# Patient Record
Sex: FEMALE | Race: OTHER | NOT HISPANIC OR LATINO | Employment: FULL TIME | URBAN - METROPOLITAN AREA
[De-identification: names, ages, dates, MRNs, and addresses within clinical notes are randomized per-mention and may not be internally consistent; named-entity substitution may affect disease eponyms.]

---

## 2017-01-24 ENCOUNTER — OFFICE VISIT (OUTPATIENT)
Dept: MEDICAL GROUP | Facility: CLINIC | Age: 57
End: 2017-01-24
Payer: COMMERCIAL

## 2017-01-24 VITALS
HEIGHT: 64 IN | HEART RATE: 74 BPM | OXYGEN SATURATION: 98 % | RESPIRATION RATE: 16 BRPM | SYSTOLIC BLOOD PRESSURE: 120 MMHG | WEIGHT: 136 LBS | DIASTOLIC BLOOD PRESSURE: 68 MMHG | BODY MASS INDEX: 23.22 KG/M2

## 2017-01-24 DIAGNOSIS — J06.9 VIRAL URI: ICD-10-CM

## 2017-01-24 DIAGNOSIS — J02.9 SORE THROAT: ICD-10-CM

## 2017-01-24 LAB
INT CON NEG: NEGATIVE
INT CON POS: POSITIVE
S PYO AG THROAT QL: NORMAL

## 2017-01-24 PROCEDURE — 87880 STREP A ASSAY W/OPTIC: CPT | Performed by: FAMILY MEDICINE

## 2017-01-24 PROCEDURE — 99213 OFFICE O/P EST LOW 20 MIN: CPT | Performed by: FAMILY MEDICINE

## 2017-01-24 RX ORDER — ERGOCALCIFEROL 1.25 MG/1
CAPSULE ORAL
COMMUNITY
End: 2021-06-15

## 2017-01-24 NOTE — MR AVS SNAPSHOT
"        Fabiana Flores   2017 10:20 AM   Office Visit   MRN: [id number]    Department:  [facility]   Dept Phone:  [phone]    Description:  Female : 1960   Provider:  Linda Heaton M.D.           Reason for Visit     URI headaches; sore throat; sore throat; bilateral ear pain      Allergies as of 2017     Allergen Noted Reactions    Food 10/13/2016       Celiac    Nkda [No Known Drug Allergy] 09/15/2007       Gluten allergy      You were diagnosed with     Viral URI   [496120]       Sore throat   [034461]         Vital Signs     Blood Pressure Pulse Respirations Height Weight Body Mass Index    120/68 mmHg 74 16 1.626 m (5' 4\") 61.689 kg (136 lb) 23.33 kg/m2    Oxygen Saturation Smoking Status                98% Never Smoker           Basic Information     Date Of Birth Sex Race Ethnicity Preferred Language    1960 Female Other Non- English      Your appointments     Mar 10, 2017  3:20 PM   Established Patient with Ray Irizarry D.O.   [address] [id number] [phone]           You will be receiving a confirmation call a few days before your appointment from our automated call confirmation system.              Problem List              ICD-10-CM Priority Class Noted - Resolved    Acquired hypothyroidism E03.9   10/13/2016 - Present    Vaginal atrophy N95.2   10/13/2016 - Present      Health Maintenance        Date Due Completion Dates    IMM DTaP/Tdap/Td Vaccine (1 - Tdap) 1980 ---    MAMMOGRAM 2017, 2015, 2014, 2013, 10/18/2011, 2009, 2009, 2008, 2008, 2007, 2007    PAP SMEAR 10/13/2019 10/13/2016, 10/13/2016 (Done)    Override on 10/13/2016: Done    COLONOSCOPY 2026            Results     POCT Rapid Strep A      Component    Rapid Strep Screen    begative    Internal Control Positive    Positive    Internal Control Negative   " Negative                        Current Immunizations     Influenza Vaccine Quad Inj (Pf) 10/1/2016 11:26 AM      Below and/or attached are the medications your provider expects you to take. Review all of your home medications and newly ordered medications with your provider and/or pharmacist. Follow medication instructions as directed by your provider and/or pharmacist. Please keep your medication list with you and share with your provider. Update the information when medications are discontinued, doses are changed, or new medications (including over-the-counter products) are added; and carry medication information at all times in the event of emergency situations     Allergies:  FOOD - (reactions not documented)     NKDA - (reactions not documented)               Medications  Valid as of: January 24, 2017 - 11:31 AM    Generic Name Brand Name Tablet Size Instructions for use    Acyclovir (Ointment) ZOVIRAX 5 % Apply 1 Application to affected area(s) every 3 hours.        Aspirin (Tablet Delayed Response) ECOTRIN 81 MG Take 81 mg by mouth every day.        Calcium Carb-Cholecalciferol (Tab) Calcium Carb-Cholecalciferol 600-800 MG-UNIT Take 2 tablet by mouth every day.        Ergocalciferol (Cap) DRISDOL 85600 UNITS Take  by mouth every 7 days.        Levothyroxine Sodium (Tab) SYNTHROID 75 MCG Take 1 Tab by mouth Every morning on an empty stomach.        Meloxicam (Tab) MOBIC 15 MG 1 TAB ONCE A DAY ONLY IF NEEDED FOR PAIN AND INFLAMMATION. TAKE WITH FOOD.        Omeprazole (CAPSULE DELAYED RELEASE) PRILOSEC 40 MG Take 1 Cap by mouth 1 time daily as needed.        Silver Sulfadiazine (Cream) SILVADENE 1 % Apply 0.5 g to affected area(s) every day.        .                 Medicines prescribed today were sent to:     [id number] - [place] - [address] [facility]    [address] [facility] [place] [address]    Phone: [phone] Fax: [phone]    Open 24 Hours?: No    [facility] - [address]    [facility] [place] [address]    Phone: [phone] Fax: [phone]    Open 24 Hours?: No      Medication refill instructions:       If your prescription bottle indicates you have medication refills left, it is not necessary to call your provider’s office. Please contact your pharmacy and they will refill your medication.    If your prescription bottle indicates you do not have any refills left, you may request refills at any time through one of the following ways: The online [employer] system (except Urgent Care), by calling your provider’s office, or by asking your pharmacy to contact your provider’s office with a refill request. Medication refills are processed only during regular business hours and may not be available until the next business day. Your provider may request additional information or to have a follow-up visit with you prior to refilling your medication.   *Please Note: Medication refills are assigned a new Rx number when refilled electronically. Your pharmacy may indicate that no refills were authorized even though a new prescription for the same medication is available at the pharmacy. Please request the medicine by name with the pharmacy before contacting your provider for a refill.           [employer] Access Code: Activation code not generated  Current [employer] Status: Active

## 2017-01-24 NOTE — Clinical Note
January 24, 2017       Patient: Fabiana Flores   YOB: 1960   Date of Visit: 1/24/2017         To Whom It May Concern:    It is my medical opinion that Fabiana Flores remain out of work until 1/26/17.    If you have any questions or concerns, please don't hesitate to call [phone]          Sincerely,          Linda Heaton M.D.  Electronically Signed

## 2017-01-24 NOTE — PROGRESS NOTES
"CC: Sore throat, ear pain    HPI:   Fabiana is a 56-year-old female who presents today with the following.    Sore throat, intermittent ear pain, intermittent dry cough for the past 2 days. Slight head congestion. Denies chest congestion or shortness of breath. Denies fever. 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 Feels fatigued. Taking Tylenol for the sore throat which helps.      Patient Active Problem List    Diagnosis Date Noted   • Acquired hypothyroidism 10/13/2016   • Vaginal atrophy 10/13/2016       Current Outpatient Prescriptions   Medication Sig Dispense Refill   • vitamin D, Ergocalciferol, (DRISDOL) 83928 UNITS Cap capsule Take  by mouth every 7 days.     • acyclovir (ZOVIRAX) 5 % Ointment Apply 1 Application to affected area(s) every 3 hours. 1 Tube 3   • levothyroxine (SYNTHROID) 75 MCG Tab Take 1 Tab by mouth Every morning on an empty stomach. 90 Tab 3   • omeprazole (PRILOSEC) 40 MG delayed-release capsule Take 1 Cap by mouth 1 time daily as needed. 30 Cap 6   • Calcium Carb-Cholecalciferol 600-800 MG-UNIT Tab Take 2 tablet by mouth every day. 60 Tab 11   • silver sulfADIAZINE (SILVADENE) 1 % Cream Apply 0.5 g to affected area(s) every day. 30 g 3   • meloxicam (MOBIC) 15 MG tablet 1 TAB ONCE A DAY ONLY IF NEEDED FOR PAIN AND INFLAMMATION. TAKE WITH FOOD. 30 Tab 0   • aspirin EC (ECOTRIN) 81 MG TBEC Take 81 mg by mouth every day.       No current facility-administered medications for this visit.         Allergies as of 01/24/2017 - Rocco as Reviewed 01/24/2017   Allergen Reaction Noted   • Food  10/13/2016   • Nkda [no known drug allergy]  09/15/2007        ROS: As per HPI.    /68 mmHg  Pulse 74  Resp 16  Ht 1.626 m (5' 4\")  Wt 61.689 kg (136 lb)  BMI 23.33 kg/m2  SpO2 98%    Physical Exam:  Gen:         Alert and oriented, No apparent distress.  HEENT:    PERRLA, EOMI, oropharynx mildly erythematous without exudates, TMs clear bilaterally.  Neck:        No Lymphadenopathy   Lungs:     " Clear to auscultation bilaterally  CV:          Regular rate and rhythm. No murmurs, rubs or gallops.                   Ext:          No clubbing, cyanosis, edema.    Office Visit on 01/24/2017   Component Date Value Ref Range Status   • Rapid Strep Screen 01/24/2017 begative   Final   • Internal Control Positive 01/24/2017 Positive   Final   • Internal Control Negative 01/24/2017 Negative   Final         Assessment and Plan.   56 y.o. female with the following issues.    1. Viral URI with sore throat  -Supportive measures. She is given a note to stay off work for at least a couple of days.

## 2017-01-26 ENCOUNTER — TELEPHONE (OUTPATIENT)
Dept: MEDICAL GROUP | Facility: CLINIC | Age: 57
End: 2017-01-26

## 2017-01-26 DIAGNOSIS — J02.9 SORE THROAT: ICD-10-CM

## 2017-01-26 NOTE — TELEPHONE ENCOUNTER
I have ordered a throat culture since her sore throat has worsened. We want to make sure that her rapid strep test was not a false negative. She can go to the lab ASAP so they can obtain swab. Results will take 2-3 days. For pain I recommend ibuprofen 600 mg every 6 hours as needed. Take with food. Also hot liquids such as tea with honey and chicken noodle soup might be soothing. If her symptoms continue to worsen, she should be seen again.

## 2017-01-26 NOTE — TELEPHONE ENCOUNTER
VOICEMAIL (on ext [phone]) Left on 1/24/17 @ 12:49pm  1. Caller Name: Fabiana                      Call Back Number: [phone]    2. Message: Pt stated her throat was still very sore and ears were hurting. Would like to know if there was something she could take OTC or otherwise for pain.     3. Patient approves office to leave a detailed voicemail/MyChart message: yes    Called pt for follow up, see below.    1. Caller Name: Fabiana                                         Call Back Number: [phone]      Patient approves a detailed voicemail message: yes    2. What are the patient's symptoms (location & severity)? Throat and ear pain increased since 1/24/17 visit and would still like to know if Dr. Heaton can recommend OTC medication or otherwise for pain.    3. Is this a new symptom No    4. When did it start? 1/22/17    5. Action taken per Active Symptom Guide: Pt was already seen and only wanted recommendation of medications to help with pain.

## 2017-01-27 ENCOUNTER — HOSPITAL ENCOUNTER (OUTPATIENT)
Dept: LAB | Facility: MEDICAL CENTER | Age: 57
End: 2017-01-27
Attending: INTERNAL MEDICINE
Payer: COMMERCIAL

## 2017-01-27 DIAGNOSIS — E03.8 OTHER SPECIFIED HYPOTHYROIDISM: ICD-10-CM

## 2017-01-27 LAB
ALBUMIN SERPL BCP-MCNC: 4.2 G/DL (ref 3.2–4.9)
ALP SERPL-CCNC: 106 U/L (ref 30–99)
ALT SERPL-CCNC: 38 U/L (ref 2–50)
AST SERPL-CCNC: 36 U/L (ref 12–45)
BILIRUB CONJ SERPL-MCNC: 0.1 MG/DL (ref 0.1–0.5)
BILIRUB INDIRECT SERPL-MCNC: 0.3 MG/DL (ref 0–1)
BILIRUB SERPL-MCNC: 0.4 MG/DL (ref 0.1–1.5)
PROT SERPL-MCNC: 7.8 G/DL (ref 6–8.2)
T4 FREE SERPL-MCNC: 1.1 NG/DL (ref 0.53–1.43)
TSH SERPL DL<=0.005 MIU/L-ACNC: 1.28 UIU/ML (ref 0.3–3.7)

## 2017-01-27 PROCEDURE — 84443 ASSAY THYROID STIM HORMONE: CPT

## 2017-01-27 PROCEDURE — 84439 ASSAY OF FREE THYROXINE: CPT

## 2017-01-27 PROCEDURE — 36415 COLL VENOUS BLD VENIPUNCTURE: CPT

## 2017-01-27 PROCEDURE — 80076 HEPATIC FUNCTION PANEL: CPT

## 2017-03-10 ENCOUNTER — OFFICE VISIT (OUTPATIENT)
Dept: MEDICAL GROUP | Facility: CLINIC | Age: 57
End: 2017-03-10
Payer: COMMERCIAL

## 2017-03-10 VITALS
DIASTOLIC BLOOD PRESSURE: 82 MMHG | TEMPERATURE: 98.2 F | HEIGHT: 64 IN | OXYGEN SATURATION: 98 % | SYSTOLIC BLOOD PRESSURE: 128 MMHG | HEART RATE: 76 BPM | WEIGHT: 137 LBS | BODY MASS INDEX: 23.39 KG/M2

## 2017-03-10 DIAGNOSIS — G43.109 MIGRAINE WITH AURA AND WITHOUT STATUS MIGRAINOSUS, NOT INTRACTABLE: ICD-10-CM

## 2017-03-10 DIAGNOSIS — R05.9 COUGH: ICD-10-CM

## 2017-03-10 DIAGNOSIS — Z87.19 H/O GASTROESOPHAGEAL REFLUX (GERD): ICD-10-CM

## 2017-03-10 PROCEDURE — 99214 OFFICE O/P EST MOD 30 MIN: CPT | Performed by: INTERNAL MEDICINE

## 2017-03-10 RX ORDER — SUMATRIPTAN 25 MG/1
25-100 TABLET, FILM COATED ORAL
Qty: 10 TAB | Refills: 3 | Status: SHIPPED | OUTPATIENT
Start: 2017-03-10 | End: 2021-06-15

## 2017-03-10 RX ORDER — FLUTICASONE PROPIONATE 50 MCG
2 SPRAY, SUSPENSION (ML) NASAL DAILY
Qty: 16 G | Refills: 11 | Status: SHIPPED | OUTPATIENT
Start: 2017-03-10 | End: 2021-06-15

## 2017-03-10 RX ORDER — OMEPRAZOLE 40 MG/1
40 CAPSULE, DELAYED RELEASE ORAL
Qty: 30 CAP | Refills: 6 | Status: SHIPPED | OUTPATIENT
Start: 2017-03-10 | End: 2021-06-15

## 2017-03-10 NOTE — MR AVS SNAPSHOT
"        Fabiana Flores   3/10/2017 3:20 PM   Office Visit   MRN: [id number]    Department:  [facility]   Dept Phone:  [phone]    Description:  Female : 1960   Provider:  Ray Irizarry D.O.           Reason for Visit     Follow-Up           Allergies as of 3/10/2017     Allergen Noted Reactions    Food 10/13/2016       Celiac    Nkda [No Known Drug Allergy] 09/15/2007       Gluten allergy      You were diagnosed with     Cough   [786.2.ICD-9-CM]       Migraine with aura and without status migrainosus, not intractable   [715915]       H/O gastroesophageal reflux (GERD)   [429487]         Vital Signs     Blood Pressure Pulse Temperature Height Weight Body Mass Index    128/82 mmHg 76 36.8 °C (98.2 °F) 1.626 m (5' 4\") 62.143 kg (137 lb) 23.50 kg/m2    Oxygen Saturation Smoking Status                98% Never Smoker           Basic Information     Date Of Birth Sex Race Ethnicity Preferred Language    1960 Female Other Non- English      Your appointments     Sep 15, 2017  4:00 PM   Established Patient with Ray Irizarry D.O.   [address] [address] [phone]           You will be receiving a confirmation call a few days before your appointment from our automated call confirmation system.              Problem List              ICD-10-CM Priority Class Noted - Resolved    Acquired hypothyroidism E03.9   10/13/2016 - Present    Vaginal atrophy N95.2   10/13/2016 - Present    Migraine with aura and without status migrainosus, not intractable G43.109   3/10/2017 - Present      Health Maintenance        Date Due Completion Dates    IMM DTaP/Tdap/Td Vaccine (1 - Tdap) 1980 ---    MAMMOGRAM 2017, 2015, 2014, 2013, 10/18/2011, 2009, 2009, 2008, 2008, 2007, 2007    PAP SMEAR 10/13/2019 10/13/2016, 10/13/2016 (Done)    Override on 10/13/2016: Done    COLONOSCOPY " 7/13/2026 7/13/2016            Current Immunizations     Influenza Vaccine Quad Inj (Pf) 10/1/2016 11:26 AM      Below and/or attached are the medications your provider expects you to take. Review all of your home medications and newly ordered medications with your provider and/or pharmacist. Follow medication instructions as directed by your provider and/or pharmacist. Please keep your medication list with you and share with your provider. Update the information when medications are discontinued, doses are changed, or new medications (including over-the-counter products) are added; and carry medication information at all times in the event of emergency situations     Allergies:  FOOD - (reactions not documented)     NKDA - (reactions not documented)               Medications  Valid as of: March 10, 2017 -  4:08 PM    Generic Name Brand Name Tablet Size Instructions for use    Acyclovir (Ointment) ZOVIRAX 5 % Apply 1 Application to affected area(s) every 3 hours.        Aspirin (Tablet Delayed Response) ECOTRIN 81 MG Take 81 mg by mouth every day.        Calcium Carb-Cholecalciferol (Tab) Calcium Carb-Cholecalciferol 600-800 MG-UNIT Take 2 tablet by mouth every day.        Ergocalciferol (Cap) DRISDOL 20739 UNITS Take  by mouth every 7 days.        Fluticasone Propionate (Suspension) FLONASE 50 MCG/ACT Spray 2 Sprays in nose every day.        Levothyroxine Sodium (Tab) SYNTHROID 75 MCG Take 1 Tab by mouth Every morning on an empty stomach.        Meloxicam (Tab) MOBIC 15 MG 1 TAB ONCE A DAY ONLY IF NEEDED FOR PAIN AND INFLAMMATION. TAKE WITH FOOD.        Omeprazole (CAPSULE DELAYED RELEASE) PRILOSEC 40 MG Take 1 Cap by mouth 1 time daily as needed.        Silver Sulfadiazine (Cream) SILVADENE 1 % Apply 0.5 g to affected area(s) every day.        SUMAtriptan Succinate (Tab) IMITREX 25 MG Take 1-4 Tabs by mouth Once PRN for Migraine for up to 1 dose.        .                 Medicines prescribed today were sent to:        [id number] - [place], [place] - [address] [facility]    [address] [facility] [place] [address]    Phone: [phone] Fax: [phone]    Open 24 Hours?: No    [facility] - [place], [place] - [address]    [address] [place] [address]    Phone: [phone] Fax: [phone]    Open 24 Hours?: No      Medication refill instructions:       If your prescription bottle indicates you have medication refills left, it is not necessary to call your provider’s office. Please contact your pharmacy and they will refill your medication.    If your prescription bottle indicates you do not have any refills left, you may request refills at any time through one of the following ways: The online [employer] system (except Urgent Care), by calling your provider’s office, or by asking your pharmacy to contact your provider’s office with a refill request. Medication refills are processed only during regular business hours and may not be available until the next business day. Your provider may request additional information or to have a follow-up visit with you prior to refilling your medication.   *Please Note: Medication refills are assigned a new Rx number when refilled electronically. Your pharmacy may indicate that no refills were authorized even though a new prescription for the same medication is available at the pharmacy. Please request the medicine by name with the pharmacy before contacting your provider for a refill.        Your To Do List     Future Labs/Procedures Complete By Expires    [facility] WITH DIFFERENTIAL  As directed 3/10/2018         [employer] Access Code: Activation code not generated  Current [employer] Status: Active

## 2017-03-11 NOTE — PROGRESS NOTES
CC: Fabiana Flores is a 57 y.o. female is suffering from   Chief Complaint   Patient presents with   • Follow-Up         SUBJECTIVE:  1. Cough  Patient's here for follow-up has had problems with coughing or the past 5 weeks. Does not appear to be improving, discussed using omeprazole to help with this possibly restarting allergy medications.     2. Migraine with aura and without status migrainosus, not intractable  Patient with a history of classical migraine states she's had this for a very long time, feels her coughing is exacerbating her migraines.     3. H/O gastroesophageal reflux (GERD)  Patient with a history of gastric soft reflux is to restart omeprazole 40 mg        Past social, family, history:   Social History   Substance Use Topics   • Smoking status: Never Smoker    • Smokeless tobacco: Never Used   • Alcohol Use: No         MEDICATIONS:    Current outpatient prescriptions:   •  SUMAtriptan (IMITREX) 25 MG Tab tablet, Take 1-4 Tabs by mouth Once PRN for Migraine for up to 1 dose., Disp: 10 Tab, Rfl: 3  •  omeprazole (PRILOSEC) 40 MG delayed-release capsule, Take 1 Cap by mouth 1 time daily as needed., Disp: 30 Cap, Rfl: 6  •  fluticasone (FLONASE) 50 MCG/ACT nasal spray, Spray 2 Sprays in nose every day., Disp: 16 g, Rfl: 11  •  vitamin D, Ergocalciferol, (DRISDOL) 27215 UNITS Cap capsule, Take  by mouth every 7 days., Disp: , Rfl:   •  silver sulfADIAZINE (SILVADENE) 1 % Cream, Apply 0.5 g to affected area(s) every day., Disp: 30 g, Rfl: 3  •  acyclovir (ZOVIRAX) 5 % Ointment, Apply 1 Application to affected area(s) every 3 hours., Disp: 1 Tube, Rfl: 3  •  levothyroxine (SYNTHROID) 75 MCG Tab, Take 1 Tab by mouth Every morning on an empty stomach., Disp: 90 Tab, Rfl: 3  •  Calcium Carb-Cholecalciferol 600-800 MG-UNIT Tab, Take 2 tablet by mouth every day., Disp: 60 Tab, Rfl: 11  •  meloxicam (MOBIC) 15 MG tablet, 1 TAB ONCE A DAY ONLY IF NEEDED FOR PAIN AND INFLAMMATION. TAKE WITH FOOD., Disp:  "30 Tab, Rfl: 0  •  aspirin EC (ECOTRIN) 81 MG TBEC, Take 81 mg by mouth every day., Disp: , Rfl:     PROBLEMS:  Patient Active Problem List    Diagnosis Date Noted   • Migraine with aura and without status migrainosus, not intractable 03/10/2017   • Acquired hypothyroidism 10/13/2016   • Vaginal atrophy 10/13/2016       REVIEW OF SYSTEMS:  Gen.:  No Nausea, Vomiting, fever, Chills.  Heart: No chest pain.  Lungs:  No shortness of Breath.  Psychological: Collin unusual Anxiety depression     PHYSICAL EXAM   Constitutional: Alert, cooperative, not in acute distress.  Cardiovascular:  Rate Rhythm is regular without murmurs rubs clicks.     Thorax & Lungs: Clear to auscultation, no wheezing, rhonchi, or rales  HENT: Normocephalic, Atraumatic.  Eyes: PERRLA, EOMI, Conjunctiva normal.   Neck: Trachia is midline no swelling of the thyroid.   Lymphatic: No lymphadenopathy noted.   Neurologic: Alert & oriented x 3, cranial nerves II through XII are intact, Normal motor function, Normal sensory function, No focal deficits noted.   Psychiatric: Affect normal, Judgment normal, Mood normal.     VITAL SIGNS:/82 mmHg  Pulse 76  Temp(Src) 36.8 °C (98.2 °F)  Ht 1.626 m (5' 4\")  Wt 62.143 kg (137 lb)  BMI 23.50 kg/m2  SpO2 98%    Labs: Reviewed    Assessment:                                                     Plan:    1. Cough  Restart omeprazole start Flonase recheck CBC  - omeprazole (PRILOSEC) 40 MG delayed-release capsule; Take 1 Cap by mouth 1 time daily as needed.  Dispense: 30 Cap; Refill: 6  - fluticasone (FLONASE) 50 MCG/ACT nasal spray; Spray 2 Sprays in nose every day.  Dispense: 16 g; Refill: 11  - CBC WITH DIFFERENTIAL; Future    2. Migraine with aura and without status migrainosus, not intractable  Start Imitrex  - SUMAtriptan (IMITREX) 25 MG Tab tablet; Take 1-4 Tabs by mouth Once PRN for Migraine for up to 1 dose.  Dispense: 10 Tab; Refill: 3    3. H/O gastroesophageal reflux (GERD)  Restart omeprazole  - " omeprazole (PRILOSEC) 40 MG delayed-release capsule; Take 1 Cap by mouth 1 time daily as needed.  Dispense: 30 Cap; Refill: 6

## 2017-04-28 DIAGNOSIS — M79.672 PAIN IN BOTH FEET: ICD-10-CM

## 2017-04-28 DIAGNOSIS — M79.671 PAIN IN BOTH FEET: ICD-10-CM

## 2017-05-12 ENCOUNTER — PATIENT MESSAGE (OUTPATIENT)
Dept: MEDICAL GROUP | Facility: CLINIC | Age: 57
End: 2017-05-12

## 2017-05-12 RX ORDER — LEVOTHYROXINE SODIUM 0.07 MG/1
75 TABLET ORAL
Qty: 90 TAB | Refills: 3 | Status: SHIPPED | OUTPATIENT
Start: 2017-05-12 | End: 2017-05-15

## 2017-05-15 RX ORDER — LEVOTHYROXINE SODIUM 75 MCG
TABLET ORAL
Qty: 90 TAB | Refills: 3 | Status: SHIPPED | OUTPATIENT
Start: 2017-05-15 | End: 2018-05-01 | Stop reason: SDUPTHER

## 2017-05-15 NOTE — TELEPHONE ENCOUNTER
Was the patient seen in the last year in this department? Yes     Does patient have an active prescription for medications requested? Yes     Received Request Via: Patient     Change of pharmacy pt wants script sent to [facility].    Pt reported a cold for 3 days wants to know what can she take over the counter please advise. Pt was also informed she can ask her pharmacist too if needed.

## 2017-05-17 ENCOUNTER — HOSPITAL ENCOUNTER (OUTPATIENT)
Facility: MEDICAL CENTER | Age: 57
End: 2017-05-17
Attending: INTERNAL MEDICINE
Payer: COMMERCIAL

## 2017-05-17 ENCOUNTER — OFFICE VISIT (OUTPATIENT)
Dept: MEDICAL GROUP | Facility: CLINIC | Age: 57
End: 2017-05-17
Payer: COMMERCIAL

## 2017-05-17 VITALS
DIASTOLIC BLOOD PRESSURE: 82 MMHG | SYSTOLIC BLOOD PRESSURE: 122 MMHG | BODY MASS INDEX: 23.31 KG/M2 | RESPIRATION RATE: 18 BRPM | HEIGHT: 65 IN | OXYGEN SATURATION: 99 % | TEMPERATURE: 98.7 F | HEART RATE: 82 BPM | WEIGHT: 139.9 LBS

## 2017-05-17 DIAGNOSIS — R05.9 COUGH: ICD-10-CM

## 2017-05-17 DIAGNOSIS — R11.11 VOMITING WITHOUT NAUSEA, INTRACTABILITY OF VOMITING NOT SPECIFIED, UNSPECIFIED VOMITING TYPE: ICD-10-CM

## 2017-05-17 PROCEDURE — 87798 DETECT AGENT NOS DNA AMP: CPT

## 2017-05-17 PROCEDURE — 99213 OFFICE O/P EST LOW 20 MIN: CPT | Performed by: INTERNAL MEDICINE

## 2017-05-17 RX ORDER — AZITHROMYCIN 250 MG/1
TABLET, FILM COATED ORAL
Qty: 6 TAB | Refills: 0 | Status: SHIPPED | OUTPATIENT
Start: 2017-05-17 | End: 2017-09-08

## 2017-05-17 ASSESSMENT — PATIENT HEALTH QUESTIONNAIRE - PHQ9: CLINICAL INTERPRETATION OF PHQ2 SCORE: 0

## 2017-05-17 NOTE — Clinical Note
May 17, 2017        Patient: Fabiana Flores   YOB: 1960   Date of Visit: 5/17/2017           To Whom It May Concern:    It is my medical opinion that Fabiana Flores needs to be off work May 18-19, 2017.    If you have any questions or concerns, please don't hesitate to call.        Sincerely,          Ray Irizarry D.O.      [address] [id number] [phone] (Phone)  [phone] (Fax)

## 2017-05-18 NOTE — PROGRESS NOTES
CC: Fabiana Flores is a 57 y.o. female is suffering from   Chief Complaint   Patient presents with   • Cough     present since 05/12/17         SUBJECTIVE:  1. Cough  Patient's here for follow-up, states she's had problems of coughing since May 12, 2017. 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      2. Vomiting without nausea, intractability of vomiting not specified, unspecified vomiting type  Patient states she's had problems with coughing to the point of vomiting concerning for possible exposure to pertussis I recommended that she take time off work that she start azithromycin nasal pharyngeal swab was obtained today        Past social, family, history:   Social History   Substance Use Topics   • Smoking status: Never Smoker    • Smokeless tobacco: Never Used   • Alcohol Use: No         MEDICATIONS:    Current outpatient prescriptions:   •  azithromycin (ZITHROMAX) 250 MG Tab, Two day one then qd x 4., Disp: 6 Tab, Rfl: 0  •  SYNTHROID 75 MCG Tab, TAKE ONE TABLET BY MOUTH EVERY MORNING ON AN EMPTY STOMACH, Disp: 90 Tab, Rfl: 3  •  SUMAtriptan (IMITREX) 25 MG Tab tablet, Take 1-4 Tabs by mouth Once PRN for Migraine for up to 1 dose., Disp: 10 Tab, Rfl: 3  •  omeprazole (PRILOSEC) 40 MG delayed-release capsule, Take 1 Cap by mouth 1 time daily as needed., Disp: 30 Cap, Rfl: 6  •  vitamin D, Ergocalciferol, (DRISDOL) 43647 UNITS Cap capsule, Take  by mouth every 7 days., Disp: , Rfl:   •  fluticasone (FLONASE) 50 MCG/ACT nasal spray, Spray 2 Sprays in nose every day., Disp: 16 g, Rfl: 11  •  silver sulfADIAZINE (SILVADENE) 1 % Cream, Apply 0.5 g to affected area(s) every day., Disp: 30 g, Rfl: 3  •  meloxicam (MOBIC) 15 MG tablet, 1 TAB ONCE A DAY ONLY IF NEEDED FOR PAIN AND INFLAMMATION. TAKE WITH FOOD., Disp: 30 Tab, Rfl: 0  •  acyclovir (ZOVIRAX) 5 % Ointment, Apply 1 Application to affected area(s) every 3 hours., Disp: 1 Tube, Rfl: 3  •  " Calcium Carb-Cholecalciferol 600-800 MG-UNIT Tab, Take 2 tablet by mouth every day., Disp: 60 Tab, Rfl: 11  •  aspirin EC (ECOTRIN) 81 MG TBEC, Take 81 mg by mouth every day., Disp: , Rfl:     PROBLEMS:  Patient Active Problem List    Diagnosis Date Noted   • Migraine with aura and without status migrainosus, not intractable 03/10/2017   • Acquired hypothyroidism 10/13/2016   • Vaginal atrophy 10/13/2016       REVIEW OF SYSTEMS:  Gen.:  No Nausea, Vomiting, fever, Chills.  Heart: No chest pain.  Lungs:  No shortness of Breath.  Psychological: Collin unusual Anxiety depression     PHYSICAL EXAM   Constitutional: Alert, cooperative, not in acute distress.  Cardiovascular:  Rate Rhythm is regular without murmurs rubs clicks.     Thorax & Lungs: Clear to auscultation, no wheezing, rhonchi, or rales  HENT: Normocephalic, Atraumatic.  Eyes: PERRLA, EOMI, Conjunctiva normal.   Neck: Trachia is midline no swelling of the thyroid.   Neurologic: Alert & oriented x 3, cranial nerves II through XII are intact, Normal motor function, Normal sensory function, No focal deficits noted.   Psychiatric: Affect normal, Judgment normal, Mood normal.     VITAL SIGNS:/82 mmHg  Pulse 82  Temp(Src) 37.1 °C (98.7 °F)  Resp 18  Ht 1.651 m (5' 5\")  Wt 63.458 kg (139 lb 14.4 oz)  BMI 23.28 kg/m2  SpO2 99%  Breastfeeding? No    Labs: Reviewed    Assessment:                                                     Plan:    1. Cough  Patient with cough leading to vomiting ×2 days start azithromycin immediately avoid contact at work with children or others nasal swab obtained and has been [facility] lab.   - azithromycin (ZITHROMAX) 250 MG Tab; Two day one then qd x 4.  Dispense: 6 Tab; Refill: 0  - BORDETELLA PERTUSSIS, DFA    2. Vomiting without nausea, intractability of vomiting not specified, unspecified vomiting type  Possible pertussis orders written. Start azithromycin immediately  - BORDETELLA PERTUSSIS, DFA          "

## 2017-05-22 ENCOUNTER — TELEPHONE (OUTPATIENT)
Dept: MEDICAL GROUP | Facility: CLINIC | Age: 57
End: 2017-05-22

## 2017-05-22 DIAGNOSIS — R06.02 SOB (SHORTNESS OF BREATH): ICD-10-CM

## 2017-05-22 RX ORDER — ALBUTEROL SULFATE 90 UG/1
2 AEROSOL, METERED RESPIRATORY (INHALATION) EVERY 6 HOURS PRN
Qty: 8.5 G | Refills: 3 | Status: SHIPPED | OUTPATIENT
Start: 2017-05-22 | End: 2021-06-15

## 2017-05-22 NOTE — TELEPHONE ENCOUNTER
VOICEMAIL  1. Caller Name: Fabiana Flores                      Call Back Number: [phone] (home)     2. Message: pt called she is done with antibiotics z-pack. She still having sleepless nights and out of breath used her daughter's inhaler it help a little but the cough is keeping her up all night long. Is there something for the cough and the chest tightness she can take? Please advise.     3. Patient approves office to leave a detailed voicemail/MyChart message: yes

## 2017-05-23 LAB — B PERT DNA SPEC QL NAA+PROBE: NORMAL

## 2017-08-22 ENCOUNTER — OFFICE VISIT (OUTPATIENT)
Dept: MEDICAL GROUP | Facility: CLINIC | Age: 57
End: 2017-08-22
Payer: COMMERCIAL

## 2017-08-22 VITALS
BODY MASS INDEX: 22.66 KG/M2 | DIASTOLIC BLOOD PRESSURE: 72 MMHG | WEIGHT: 136 LBS | OXYGEN SATURATION: 95 % | HEART RATE: 84 BPM | SYSTOLIC BLOOD PRESSURE: 116 MMHG | TEMPERATURE: 99.1 F | HEIGHT: 65 IN

## 2017-08-22 DIAGNOSIS — S22.20XD CLOSED FRACTURE OF STERNUM WITH ROUTINE HEALING, UNSPECIFIED PORTION OF STERNUM, SUBSEQUENT ENCOUNTER: ICD-10-CM

## 2017-08-22 DIAGNOSIS — R07.81 RIB PAIN ON LEFT SIDE: ICD-10-CM

## 2017-08-22 DIAGNOSIS — M54.6 THORACIC SPINE PAIN: ICD-10-CM

## 2017-08-22 DIAGNOSIS — R11.2 NAUSEA AND VOMITING, INTRACTABILITY OF VOMITING NOT SPECIFIED, UNSPECIFIED VOMITING TYPE: ICD-10-CM

## 2017-08-22 PROCEDURE — 99214 OFFICE O/P EST MOD 30 MIN: CPT | Performed by: INTERNAL MEDICINE

## 2017-08-22 RX ORDER — ONDANSETRON 4 MG/1
4 TABLET, FILM COATED ORAL EVERY 6 HOURS PRN
Qty: 30 TAB | Refills: 0 | Status: SHIPPED | OUTPATIENT
Start: 2017-08-22 | End: 2021-06-15

## 2017-08-22 RX ORDER — HYDROCODONE BITARTRATE AND ACETAMINOPHEN 5; 325 MG/1; MG/1
1-2 TABLET ORAL EVERY 4 HOURS PRN
COMMUNITY
End: 2017-08-22

## 2017-08-22 RX ORDER — ONDANSETRON 4 MG/1
4 TABLET, ORALLY DISINTEGRATING ORAL EVERY 8 HOURS PRN
COMMUNITY
End: 2017-08-22

## 2017-08-22 RX ORDER — HYDROCODONE BITARTRATE AND ACETAMINOPHEN 10; 325 MG/1; MG/1
.5-1 TABLET ORAL EVERY 6 HOURS PRN
Qty: 60 TAB | Refills: 0 | Status: SHIPPED | OUTPATIENT
Start: 2017-08-22 | End: 2019-07-22

## 2017-08-22 RX ORDER — METHOCARBAMOL 500 MG/1
1000 TABLET, FILM COATED ORAL 4 TIMES DAILY
COMMUNITY
End: 2017-08-22

## 2017-08-22 RX ORDER — METHOCARBAMOL 500 MG/1
1000 TABLET, FILM COATED ORAL 3 TIMES DAILY
Qty: 60 TAB | Refills: 1 | Status: SHIPPED | OUTPATIENT
Start: 2017-08-22 | End: 2017-08-29 | Stop reason: SDUPTHER

## 2017-08-22 ASSESSMENT — PAIN SCALES - GENERAL: PAINLEVEL: 8=MODERATE-SEVERE PAIN

## 2017-08-22 NOTE — Clinical Note
August 22, 2017         Patient: Fabiana Flores   YOB: 1960   Date of Visit: 8/22/2017           To Whom it May Concern:    Fabiana Flores was seen in my clinic on 8/22/2017. She may return to work on 8/25/17.    If you have any questions or concerns, please don't hesitate to call.        Sincerely,           Ray Irizarry D.O.  Electronically Signed
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
## 2017-08-22 NOTE — Clinical Note
August 22, 2017        Patient: Fabiana Flores   YOB: 1960   Date of Visit: 8/22/2017           To Whom It May Concern:    It is my medical opinion that Fabiana Flores is suffering from a nature medical condition, and will need to be off work from August 21, 2017 through September 5, 2017.    If you have any questions or concerns, please don't hesitate to call.        Sincerely,          Ray Irizarry D.O.      [address] [id number] [phone] (Phone)  [phone] (Fax)

## 2017-08-23 ENCOUNTER — TELEPHONE (OUTPATIENT)
Dept: MEDICAL GROUP | Facility: CLINIC | Age: 57
End: 2017-08-23

## 2017-08-23 ENCOUNTER — HOSPITAL ENCOUNTER (OUTPATIENT)
Dept: RADIOLOGY | Facility: MEDICAL CENTER | Age: 57
End: 2017-08-23
Attending: INTERNAL MEDICINE
Payer: COMMERCIAL

## 2017-08-23 DIAGNOSIS — M54.6 THORACIC SPINE PAIN: ICD-10-CM

## 2017-08-23 DIAGNOSIS — S22.20XD CLOSED FRACTURE OF STERNUM WITH ROUTINE HEALING, UNSPECIFIED PORTION OF STERNUM, SUBSEQUENT ENCOUNTER: ICD-10-CM

## 2017-08-23 DIAGNOSIS — R07.81 RIB PAIN ON LEFT SIDE: ICD-10-CM

## 2017-08-23 PROCEDURE — 72070 X-RAY EXAM THORAC SPINE 2VWS: CPT

## 2017-08-23 PROCEDURE — 71120 X-RAY EXAM BREASTBONE 2/>VWS: CPT

## 2017-08-23 PROCEDURE — 71101 X-RAY EXAM UNILAT RIBS/CHEST: CPT | Mod: LT

## 2017-08-23 NOTE — TELEPHONE ENCOUNTER
Phone Number Called: [phone] (home)     Message: Left voice message for patient to call the office and let me know if she would like to  the letter or if she would like me to send it somewhere.     Left Message for patient to call back: yes
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
## 2017-08-24 ENCOUNTER — PATIENT MESSAGE (OUTPATIENT)
Dept: MEDICAL GROUP | Facility: CLINIC | Age: 57
End: 2017-08-24
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
## 2017-08-25 ENCOUNTER — TELEPHONE (OUTPATIENT)
Dept: MEDICAL GROUP | Facility: CLINIC | Age: 57
End: 2017-08-25
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
## 2017-08-25 DIAGNOSIS — R93.7 ABNORMAL MRI, THORACIC SPINE: ICD-10-CM

## 2017-08-25 DIAGNOSIS — M43.9 WEDGE DEFORMITY ON X-RAY OF SPINE: ICD-10-CM

## 2017-08-25 NOTE — TELEPHONE ENCOUNTER
1. Caller Name: Fabiana Flores                                           Call Back Number: [phone] (home)         Patient approves a detailed voicemail message: yes    Spoke with patient and she has several questions in regards to her treatment plan. Scheduled appointment with Dr Irizarry on 08/29/17 for follow up discussion. Dr Irizarry verbally stated that the patient should continue normal activity as tolerated. Patient was advised to walk and continue moving through normal activities.     ---Patient would like Dr Irizarry to review [employer] messages in regards to last office visit.     ---Patient would also like to have all additional testing and referrals in place prior to her scheduled appointment if additional imaging is recommended. Does the patient need to see orthopedic doctor? Please review and place necessary orders for patient.     ---Patient would also like to know the status of accepting her spouse as a new patient, stated that Dr Irizarry was going to get clearance from management. Please advise, thank you.
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
## 2017-08-26 ENCOUNTER — HOSPITAL ENCOUNTER (OUTPATIENT)
Dept: RADIOLOGY | Facility: MEDICAL CENTER | Age: 57
End: 2017-08-26
Attending: INTERNAL MEDICINE
Payer: COMMERCIAL
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
## 2017-08-26 DIAGNOSIS — S22.20XD CLOSED FRACTURE OF STERNUM WITH ROUTINE HEALING, UNSPECIFIED PORTION OF STERNUM, SUBSEQUENT ENCOUNTER: ICD-10-CM

## 2017-08-26 DIAGNOSIS — R93.7 ABNORMAL MRI, THORACIC SPINE: ICD-10-CM

## 2017-08-26 PROCEDURE — 72146 MRI CHEST SPINE W/O DYE: CPT

## 2017-08-28 ENCOUNTER — TELEPHONE (OUTPATIENT)
Dept: MEDICAL GROUP | Facility: CLINIC | Age: 57
End: 2017-08-28
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
## 2017-08-28 NOTE — TELEPHONE ENCOUNTER
Future Appointments       Provider Department [place]    8/29/2017 11:00 AM Ray Irizarry D.O. [facility]    9/5/2017 3:20 PM Ray Irizarry D.O. [facility]          ESTABLISHED PATIENT PRE-VISIT PLANNING     Note: Patient will not be contacted if there is no indication to call. PT was not Contacted.    1.    Reviewed note from last office visit with PCP: YES Last office visit: 8/22/17    2.  If any orders were placed at last visit, do we have Results/Consult Notes?        •  Labs - Labs were not ordered at last office visit.        •  Imaging - Imaging ordered, completed and results are in chart.        •  Referrals - No referrals were ordered at last office visit.     3.  Immunizations were updated in Epic using WebIZ?: Epic matches WebIZ       •  Web Iz Recommendations:   MMR   CPOX (Varicella)   Influenza w/preserv.   Tdap         4.  Patient is due for the following Health Maintenance Topics:   Health Maintenance Due   Topic Date Due   • IMM DTaP/Tdap/Td Vaccine (1 - Tdap) 01/30/1980   • MAMMOGRAM  08/17/2017   • IMM INFLUENZA (1) 09/01/2017           5.  Patient was not informed to arrive 15 min prior to their scheduled appointment and bring in their medication bottles.

## 2017-08-29 ENCOUNTER — HOSPITAL ENCOUNTER (OUTPATIENT)
Dept: RADIOLOGY | Facility: MEDICAL CENTER | Age: 57
End: 2017-08-29
Attending: INTERNAL MEDICINE
Payer: COMMERCIAL

## 2017-08-29 ENCOUNTER — TELEPHONE (OUTPATIENT)
Dept: MEDICAL GROUP | Facility: CLINIC | Age: 57
End: 2017-08-29

## 2017-08-29 ENCOUNTER — OFFICE VISIT (OUTPATIENT)
Dept: MEDICAL GROUP | Facility: CLINIC | Age: 57
End: 2017-08-29
Payer: COMMERCIAL

## 2017-08-29 VITALS
BODY MASS INDEX: 23.37 KG/M2 | HEIGHT: 64 IN | WEIGHT: 136.9 LBS | TEMPERATURE: 98.8 F | HEART RATE: 76 BPM | DIASTOLIC BLOOD PRESSURE: 76 MMHG | SYSTOLIC BLOOD PRESSURE: 122 MMHG | RESPIRATION RATE: 18 BRPM | OXYGEN SATURATION: 98 %
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
## 2017-08-29 DIAGNOSIS — S22.20XD CLOSED FRACTURE OF STERNUM WITH ROUTINE HEALING, UNSPECIFIED PORTION OF STERNUM, SUBSEQUENT ENCOUNTER: ICD-10-CM

## 2017-08-29 DIAGNOSIS — M99.08 RIB CAGE DYSFUNCTION: ICD-10-CM

## 2017-08-29 PROBLEM — S22.070A CLOSED WEDGE COMPRESSION FRACTURE OF T10 VERTEBRA (HCC): Status: ACTIVE | Noted: 2017-08-29

## 2017-08-29 PROCEDURE — 72070 X-RAY EXAM THORAC SPINE 2VWS: CPT

## 2017-08-29 PROCEDURE — 71020 DX-CHEST-2 VIEWS: CPT

## 2017-08-29 PROCEDURE — 72040 X-RAY EXAM NECK SPINE 2-3 VW: CPT

## 2017-08-29 PROCEDURE — 72100 X-RAY EXAM L-S SPINE 2/3 VWS: CPT

## 2017-08-29 PROCEDURE — 99213 OFFICE O/P EST LOW 20 MIN: CPT | Performed by: INTERNAL MEDICINE

## 2017-08-29 RX ORDER — METHOCARBAMOL 500 MG/1
1000 TABLET, FILM COATED ORAL 3 TIMES DAILY
Qty: 60 TAB | Refills: 1 | Status: SHIPPED | OUTPATIENT
Start: 2017-08-29 | End: 2021-06-15

## 2017-08-29 ASSESSMENT — PAIN SCALES - GENERAL: PAINLEVEL: 7=MODERATE-SEVERE PAIN

## 2017-08-29 NOTE — LETTER
August 29, 2017        Patient: Fabiana Flores   YOB: 1960   Date of Visit: 8/29/2017           To Whom It May Concern:    It is my medical opinion that Fabiana Flores is suffering from a nature medical condition, and will need to be off work from August 21, 2017 through September 15, 2017.    If you have any questions or concerns, please don't hesitate to call.        Sincerely,          Ray Irizarry D.O.      [address] [place] [address] [phone] (Phone)  [phone] (Fax)      [address]  [place] [address] [phone] (Phone)  [phone] (Fax)

## 2017-08-29 NOTE — LETTER
August 29, 2017        Patient: Fabiana Flores   YOB: 1960   Date of Visit: 8/29/2017           To Whom It May Concern:    It is my medical opinion that Fabiana Flores is suffering from a  medical condition, and will need to be off work from August 21, 2017 through September 15, 2017.    If you have any questions or concerns, please don't hesitate to call.        Sincerely,          Ray Irizarry D.O.      [address] [id number] [phone] (Phone)  [phone] (Fax)

## 2017-08-29 NOTE — TELEPHONE ENCOUNTER
1. Caller Name: [facility]                                         Call Back Number: *[phone]      Patient approves a detailed voicemail message: N\A    2. SPECIFIC Action To Be Taken: Orders pending, please sign. Patient waiting at Imaging center     3. Diagnosis/Clinical Reason for Request: Previous orders arrived but not taken, new orders needed     4. Specialty & Provider Name/Lab/Imaging Location: [facility]    5. Is appointment scheduled for requested order/referral: yes - 08/29/17      Patient informed they will receive a return phone call from the office ONLY if there are any questions before processing their request. Advised to call back if they haven't received a call from the referral department in 5 days.

## 2017-08-29 NOTE — TELEPHONE ENCOUNTER
Phone Number Called: [facility] [phone]    Message: left voice message requesting radiologist to call Dr Irizarry for consult on recent imaging. Provided back line phone number [phone]    Left Message for patient to call back: N\A
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
## 2017-09-05 ENCOUNTER — OFFICE VISIT (OUTPATIENT)
Dept: MEDICAL GROUP | Facility: CLINIC | Age: 57
End: 2017-09-05
Payer: COMMERCIAL

## 2017-09-05 VITALS
SYSTOLIC BLOOD PRESSURE: 124 MMHG | RESPIRATION RATE: 18 BRPM | WEIGHT: 137.4 LBS | OXYGEN SATURATION: 95 % | DIASTOLIC BLOOD PRESSURE: 68 MMHG | HEIGHT: 64 IN | BODY MASS INDEX: 23.46 KG/M2 | HEART RATE: 74 BPM | TEMPERATURE: 99.2 F
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
## 2017-09-05 DIAGNOSIS — S22.070K: ICD-10-CM

## 2017-09-05 DIAGNOSIS — S22.22XD CLOSED FRACTURE OF BODY OF STERNUM WITH ROUTINE HEALING, SUBSEQUENT ENCOUNTER: ICD-10-CM

## 2017-09-05 PROCEDURE — 99213 OFFICE O/P EST LOW 20 MIN: CPT | Performed by: INTERNAL MEDICINE

## 2017-09-05 RX ORDER — IBUPROFEN 600 MG/1
600 TABLET ORAL EVERY 6 HOURS PRN
COMMUNITY
End: 2021-06-15

## 2017-09-05 ASSESSMENT — PAIN SCALES - GENERAL: PAINLEVEL: 7=MODERATE-SEVERE PAIN
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
## 2017-09-08 ENCOUNTER — OFFICE VISIT (OUTPATIENT)
Dept: PHYSICAL MEDICINE AND REHAB | Facility: MEDICAL CENTER | Age: 57
End: 2017-09-08
Payer: COMMERCIAL

## 2017-09-08 VITALS
HEIGHT: 64 IN | BODY MASS INDEX: 23.22 KG/M2 | OXYGEN SATURATION: 98 % | SYSTOLIC BLOOD PRESSURE: 128 MMHG | TEMPERATURE: 96.8 F | WEIGHT: 136 LBS | DIASTOLIC BLOOD PRESSURE: 86 MMHG | HEART RATE: 78 BPM

## 2017-09-08 DIAGNOSIS — R07.89 STERNAL PAIN: ICD-10-CM

## 2017-09-08 DIAGNOSIS — M54.9 MID BACK PAIN: ICD-10-CM

## 2017-09-08 DIAGNOSIS — M79.2 NERVE PAIN: ICD-10-CM

## 2017-09-08 DIAGNOSIS — R07.89 CHEST WALL PAIN: ICD-10-CM

## 2017-09-08 DIAGNOSIS — S22.000A THORACIC COMPRESSION FRACTURE, CLOSED, INITIAL ENCOUNTER (HCC): ICD-10-CM

## 2017-09-08 DIAGNOSIS — S09.90XD HEAD INJURY, SUBSEQUENT ENCOUNTER: ICD-10-CM

## 2017-09-08 DIAGNOSIS — M79.18 MYOFASCIAL PAIN: ICD-10-CM

## 2017-09-08 PROCEDURE — 99204 OFFICE O/P NEW MOD 45 MIN: CPT | Performed by: PHYSICAL MEDICINE & REHABILITATION

## 2017-09-08 RX ORDER — LIDOCAINE 50 MG/G
1 PATCH TOPICAL EVERY 24 HOURS
Qty: 30 PATCH | Refills: 1 | Status: SHIPPED | OUTPATIENT
Start: 2017-09-08 | End: 2021-06-15

## 2017-09-08 RX ORDER — GABAPENTIN 300 MG/1
300 CAPSULE ORAL 3 TIMES DAILY
Qty: 45 CAP | Refills: 1 | Status: SHIPPED | OUTPATIENT
Start: 2017-09-08 | End: 2021-06-15

## 2017-09-08 ASSESSMENT — ENCOUNTER SYMPTOMS
FEVER: 0
HEMOPTYSIS: 0
CHILLS: 0
PHOTOPHOBIA: 0
TINGLING: 1
SENSORY CHANGE: 1
ORTHOPNEA: 0
EYE PAIN: 0
PALPITATIONS: 0
VOMITING: 0
NAUSEA: 0
SPUTUM PRODUCTION: 0
MYALGIAS: 1
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
## 2017-09-14 ENCOUNTER — TELEPHONE (OUTPATIENT)
Dept: MEDICAL GROUP | Facility: CLINIC | Age: 57
End: 2017-09-14

## 2017-09-14 NOTE — TELEPHONE ENCOUNTER
Future Appointments       Provider Department Center    9/15/2017 4:00 PM Ray Irizarry D.O. [facility]    10/10/2017 9:15 AM Arcadio Reed M.D. [facility] PHYSIATRY           ESTABLISHED PATIENT PRE-VISIT PLANNING     Note: Patient will not be contacted if there is no indication to call. PT was not Contacted.    1.    Reviewed note from last office visit with PCP: YES Last office visit: 9/5/17    2.  If any orders were placed at last visit, do we have Results/Consult Notes?        •  Labs - Labs were not ordered at last office visit.        •  Imaging - Imaging was not ordered at last office visit.        •  Referrals - Referral ordered, patient was seen and consult notes are in chart. Care Teams updated  YES. PHYSIATRY  DR. REED    3.  Immunizations were updated in Epic using WebIZ?: Epic matches WebIZ       •  Web Iz Recommendations:   MMR   CPOX (Varicella)   Influenza w/preserv.   Tdap         4.  Patient is due for the following Health Maintenance Topics:   Health Maintenance Due   Topic Date Due   • IMM DTaP/Tdap/Td Vaccine (1 - Tdap) 01/30/1980   • MAMMOGRAM  08/17/2017   • IMM INFLUENZA (1) 09/01/2017           5.  Patient was not informed to arrive 15 min prior to their scheduled appointment and bring in their medication bottles.

## 2017-09-15 ENCOUNTER — OFFICE VISIT (OUTPATIENT)
Dept: MEDICAL GROUP | Facility: CLINIC | Age: 57
End: 2017-09-15
Payer: COMMERCIAL

## 2017-09-15 ENCOUNTER — TELEPHONE (OUTPATIENT)
Dept: PHYSICAL MEDICINE AND REHAB | Facility: MEDICAL CENTER | Age: 57
End: 2017-09-15

## 2017-09-15 VITALS
HEART RATE: 76 BPM | BODY MASS INDEX: 23.05 KG/M2 | SYSTOLIC BLOOD PRESSURE: 122 MMHG | RESPIRATION RATE: 18 BRPM | WEIGHT: 135 LBS | DIASTOLIC BLOOD PRESSURE: 88 MMHG | HEIGHT: 64 IN | TEMPERATURE: 99.3 F | OXYGEN SATURATION: 96 %

## 2017-09-15 DIAGNOSIS — S22.22XD CLOSED FRACTURE OF BODY OF STERNUM WITH ROUTINE HEALING, SUBSEQUENT ENCOUNTER: ICD-10-CM
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
## 2017-09-15 DIAGNOSIS — S22.070K: ICD-10-CM

## 2017-09-15 PROCEDURE — 99214 OFFICE O/P EST MOD 30 MIN: CPT | Performed by: INTERNAL MEDICINE

## 2017-09-15 ASSESSMENT — PAIN SCALES - GENERAL: PAINLEVEL: 4=SLIGHT-MODERATE PAIN

## 2017-09-15 NOTE — TELEPHONE ENCOUNTER
I let Fabiana know I will let her know what Dr. Thomas says about back brace when he let me know, probably Monday.
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
## 2017-09-15 NOTE — LETTER
September 15, 2017        Patient: Fabiana Flores   YOB: 1960   Date of Visit: 9/15/2017           To Whom It May Concern:    It is my opinion that Fabiana Flores may return on September 25, 2017 to part time work, maximum of 4 hours a day.    If you have any questions or concerns, please don't hesitate to call.        Sincerely,          Ray Irizarry D.O.      [address] [id number] [phone] (Phone)  [phone] (Fax)

## 2017-09-15 NOTE — TELEPHONE ENCOUNTER
Fabiana left message she wants to know if it is ok to use adjustable back brace she can buy at [facility].

## 2017-09-18 NOTE — TELEPHONE ENCOUNTER
"Dr. Thomas wrote, \" It would be okay to try an over-the-counter back brace \"    I I left message of above.     "

## 2017-09-29 ENCOUNTER — OFFICE VISIT (OUTPATIENT)
Dept: MEDICAL GROUP | Facility: CLINIC | Age: 57
End: 2017-09-29
Payer: COMMERCIAL

## 2017-09-29 VITALS
HEART RATE: 76 BPM | WEIGHT: 136.2 LBS | OXYGEN SATURATION: 98 % | TEMPERATURE: 98.5 F | BODY MASS INDEX: 23.25 KG/M2 | RESPIRATION RATE: 18 BRPM | SYSTOLIC BLOOD PRESSURE: 116 MMHG | DIASTOLIC BLOOD PRESSURE: 72 MMHG | HEIGHT: 64 IN
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
## 2017-09-29 DIAGNOSIS — S22.070D CLOSED WEDGE COMPRESSION FRACTURE OF TENTH THORACIC VERTEBRA WITH ROUTINE HEALING, SUBSEQUENT ENCOUNTER: ICD-10-CM

## 2017-09-29 DIAGNOSIS — S22.20XD CLOSED FRACTURE OF STERNUM WITH ROUTINE HEALING, UNSPECIFIED PORTION OF STERNUM, SUBSEQUENT ENCOUNTER: ICD-10-CM

## 2017-09-29 PROCEDURE — 99214 OFFICE O/P EST MOD 30 MIN: CPT | Performed by: INTERNAL MEDICINE

## 2017-09-29 RX ORDER — CELECOXIB 200 MG/1
200 CAPSULE ORAL DAILY
Qty: 30 CAP | Refills: 3 | Status: SHIPPED | OUTPATIENT
Start: 2017-09-29 | End: 2019-07-22

## 2017-09-29 ASSESSMENT — PAIN SCALES - GENERAL: PAINLEVEL: 5=MODERATE PAIN

## 2017-09-29 NOTE — LETTER
September 29, 2017        Patient: Fabiana Flores   YOB: 1960   Date of Visit: 9/29/2017           To Whom It May Concern:    It is my opinion that Fabiana Flores is to continue to work part time work, maximum of 4 hours a day until October 13, 2017.     If you have any questions or concerns, please don't hesitate to call.        Sincerely,          Ray Irizarry D.O.    [address] [id number] [phone] (Phone)  [phone] (Fax)
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
## 2017-10-10 ENCOUNTER — OFFICE VISIT (OUTPATIENT)
Dept: PHYSICAL MEDICINE AND REHAB | Facility: MEDICAL CENTER | Age: 57
End: 2017-10-10
Payer: COMMERCIAL

## 2017-10-10 VITALS
DIASTOLIC BLOOD PRESSURE: 64 MMHG | HEART RATE: 80 BPM | HEIGHT: 64 IN | OXYGEN SATURATION: 97 % | SYSTOLIC BLOOD PRESSURE: 110 MMHG | WEIGHT: 136 LBS | BODY MASS INDEX: 23.22 KG/M2 | TEMPERATURE: 97.5 F

## 2017-10-10 DIAGNOSIS — M47.814 OSTEOARTHRITIS OF THORACIC SPINE, UNSPECIFIED SPINAL OSTEOARTHRITIS COMPLICATION STATUS: ICD-10-CM

## 2017-10-10 DIAGNOSIS — M51.34 DDD (DEGENERATIVE DISC DISEASE), THORACIC: ICD-10-CM

## 2017-10-10 DIAGNOSIS — M54.9 MID BACK PAIN: ICD-10-CM

## 2017-10-10 DIAGNOSIS — M54.16 LUMBAR RADICULITIS: ICD-10-CM

## 2017-10-10 DIAGNOSIS — M79.18 MYOFASCIAL PAIN: ICD-10-CM

## 2017-10-10 DIAGNOSIS — M51.36 DDD (DEGENERATIVE DISC DISEASE), LUMBAR: ICD-10-CM

## 2017-10-10 DIAGNOSIS — S22.000D CLOSED COMPRESSION FRACTURE OF THORACIC VERTEBRA WITH ROUTINE HEALING, SUBSEQUENT ENCOUNTER: ICD-10-CM

## 2017-10-10 DIAGNOSIS — M54.50 LUMBOSACRAL PAIN: ICD-10-CM

## 2017-10-10 PROCEDURE — 99214 OFFICE O/P EST MOD 30 MIN: CPT | Performed by: PHYSICAL MEDICINE & REHABILITATION

## 2017-10-10 RX ORDER — ONDANSETRON 4 MG/1
TABLET, ORALLY DISINTEGRATING ORAL
COMMUNITY
Start: 2017-08-22 | End: 2021-06-15

## 2017-10-10 RX ORDER — METHYLPREDNISOLONE 4 MG/1
TABLET ORAL
COMMUNITY
Start: 2017-09-28 | End: 2021-06-15

## 2017-10-10 RX ORDER — HYDROCODONE BITARTRATE AND ACETAMINOPHEN 5; 325 MG/1; MG/1
TABLET ORAL
Refills: 0 | COMMUNITY
Start: 2017-08-19 | End: 2019-07-22
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
## 2017-10-17 ENCOUNTER — OFFICE VISIT (OUTPATIENT)
Dept: MEDICAL GROUP | Facility: CLINIC | Age: 57
End: 2017-10-17
Payer: COMMERCIAL

## 2017-10-17 VITALS
BODY MASS INDEX: 23.22 KG/M2 | DIASTOLIC BLOOD PRESSURE: 70 MMHG | SYSTOLIC BLOOD PRESSURE: 110 MMHG | HEIGHT: 64 IN | HEART RATE: 78 BPM | RESPIRATION RATE: 16 BRPM | OXYGEN SATURATION: 98 % | TEMPERATURE: 97.5 F | WEIGHT: 136 LBS

## 2017-10-17 DIAGNOSIS — S22.20XS CLOSED FRACTURE OF STERNUM, UNSPECIFIED PORTION OF STERNUM, SEQUELA: ICD-10-CM

## 2017-10-17 DIAGNOSIS — S22.070K: ICD-10-CM

## 2017-10-17 DIAGNOSIS — Z23 NEED FOR VACCINATION: ICD-10-CM
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
## 2017-10-17 PROCEDURE — 99214 OFFICE O/P EST MOD 30 MIN: CPT | Mod: 25 | Performed by: INTERNAL MEDICINE

## 2017-10-17 PROCEDURE — 90686 IIV4 VACC NO PRSV 0.5 ML IM: CPT | Performed by: INTERNAL MEDICINE

## 2017-10-17 PROCEDURE — 90471 IMMUNIZATION ADMIN: CPT | Performed by: INTERNAL MEDICINE

## 2017-10-17 NOTE — LETTER
October 17, 2017        Patient: Fabiana Flores   YOB: 1960   Date of Visit: 10/17/2017           To Whom It May Concern:    It is my opinion that Fabiana Flores is to continue to work part time work, maximum of 4 hours a day or as tolerated unitl November 14, 2017.     If you have any questions or concerns, please don't hesitate to call.        Sincerely,          Ray Irizarry D.O.  Board Certified General Internal Medicine    [address] [id number] [phone] (Phone)  [phone] (Fax)

## 2017-10-17 NOTE — LETTER
October 17, 2017        Patient: Fabiana Flores   YOB: 1960   Date of Visit: 10/17/2017           To Whom It May Concern:    It is my opinion that Fabiana Flores is to continue to work part time work, maximum of 4 hours a day or as tolerated unitl November 14, 2017.          If you have any questions or concerns, please don't hesitate to call.        Sincerely,          Ray Irizarry D.O.  Electronically Signed    [address] [phone] (Phone)  [phone] (Fax)
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
## 2017-10-23 ENCOUNTER — TELEPHONE (OUTPATIENT)
Dept: MEDICAL GROUP | Facility: CLINIC | Age: 57
End: 2017-10-23

## 2017-10-23 NOTE — TELEPHONE ENCOUNTER
Phone Number Called: [phone] (home)       Message: left voice message stating that the paper work she dropped off has been completed and is ready for  at the .     Left Message for patient to call back: no

## 2017-10-25 ENCOUNTER — APPOINTMENT (OUTPATIENT)
Dept: RADIOLOGY | Facility: MEDICAL CENTER | Age: 57
End: 2017-10-25
Attending: PHYSICAL MEDICINE & REHABILITATION
Payer: COMMERCIAL

## 2017-10-25 DIAGNOSIS — M54.50 LUMBOSACRAL PAIN: ICD-10-CM

## 2017-10-25 DIAGNOSIS — M54.16 LUMBAR RADICULITIS: ICD-10-CM

## 2017-10-25 DIAGNOSIS — M51.36 DDD (DEGENERATIVE DISC DISEASE), LUMBAR: ICD-10-CM

## 2017-10-25 PROCEDURE — 72148 MRI LUMBAR SPINE W/O DYE: CPT

## 2017-11-14 ENCOUNTER — OFFICE VISIT (OUTPATIENT)
Dept: MEDICAL GROUP | Facility: CLINIC | Age: 57
End: 2017-11-14
Payer: COMMERCIAL

## 2017-11-14 VITALS
DIASTOLIC BLOOD PRESSURE: 80 MMHG | HEART RATE: 74 BPM | WEIGHT: 137.6 LBS | RESPIRATION RATE: 14 BRPM | HEIGHT: 64 IN | BODY MASS INDEX: 23.49 KG/M2 | SYSTOLIC BLOOD PRESSURE: 142 MMHG | OXYGEN SATURATION: 97 % | TEMPERATURE: 98.6 F

## 2017-11-14 DIAGNOSIS — Z13.220 SCREENING CHOLESTEROL LEVEL: ICD-10-CM

## 2017-11-14 DIAGNOSIS — G89.29 CHRONIC MIDLINE BACK PAIN, UNSPECIFIED BACK LOCATION: ICD-10-CM

## 2017-11-14 DIAGNOSIS — M54.9 CHRONIC MIDLINE BACK PAIN, UNSPECIFIED BACK LOCATION: ICD-10-CM

## 2017-11-14 DIAGNOSIS — E03.9 ACQUIRED HYPOTHYROIDISM: ICD-10-CM

## 2017-11-14 PROCEDURE — 99214 OFFICE O/P EST MOD 30 MIN: CPT | Performed by: INTERNAL MEDICINE

## 2017-11-14 NOTE — LETTER
November 14, 2017        Patient: Fabiana Flores   YOB: 1960   Date of Visit: 11/14/2017               To Whom It May Concern:    It is my opinion that Fabiana Flores is to continue to work part time work, maximum of 5 hours a day or as tolerated unitl December 31, 2017.     If you have any questions or concerns, please don't hesitate to call.        Sincerely,          Ray Irizarry D.O.  Board Certified General Internal Medicine      [address] [phone] (Phone)  [phone] (Fax)

## 2017-11-15 ENCOUNTER — HOSPITAL ENCOUNTER (OUTPATIENT)
Dept: LAB | Facility: MEDICAL CENTER | Age: 57
End: 2017-11-15
Attending: INTERNAL MEDICINE
Payer: COMMERCIAL

## 2017-11-15 DIAGNOSIS — Z13.220 SCREENING CHOLESTEROL LEVEL: ICD-10-CM

## 2017-11-15 DIAGNOSIS — E03.9 ACQUIRED HYPOTHYROIDISM: ICD-10-CM

## 2017-11-15 DIAGNOSIS — G89.29 CHRONIC MIDLINE BACK PAIN, UNSPECIFIED BACK LOCATION: ICD-10-CM

## 2017-11-15 DIAGNOSIS — M54.9 CHRONIC MIDLINE BACK PAIN, UNSPECIFIED BACK LOCATION: ICD-10-CM

## 2017-11-15 LAB
25(OH)D3 SERPL-MCNC: 34 NG/ML (ref 30–100)
ALBUMIN SERPL BCP-MCNC: 3.8 G/DL (ref 3.2–4.9)
ALBUMIN/GLOB SERPL: 1 G/DL
ALP SERPL-CCNC: 111 U/L (ref 30–99)
ALT SERPL-CCNC: 24 U/L (ref 2–50)
ANION GAP SERPL CALC-SCNC: 6 MMOL/L (ref 0–11.9)
AST SERPL-CCNC: 25 U/L (ref 12–45)
BASOPHILS # BLD AUTO: 0.8 % (ref 0–1.8)
BASOPHILS # BLD: 0.05 K/UL (ref 0–0.12)
BILIRUB SERPL-MCNC: 0.8 MG/DL (ref 0.1–1.5)
BUN SERPL-MCNC: 10 MG/DL (ref 8–22)
CALCIUM SERPL-MCNC: 9.6 MG/DL (ref 8.5–10.5)
CHLORIDE SERPL-SCNC: 104 MMOL/L (ref 96–112)
CHOLEST SERPL-MCNC: 166 MG/DL (ref 100–199)
CO2 SERPL-SCNC: 27 MMOL/L (ref 20–33)
CREAT SERPL-MCNC: 0.58 MG/DL (ref 0.5–1.4)
EOSINOPHIL # BLD AUTO: 0.2 K/UL (ref 0–0.51)
EOSINOPHIL NFR BLD: 3.4 % (ref 0–6.9)
ERYTHROCYTE [DISTWIDTH] IN BLOOD BY AUTOMATED COUNT: 44.1 FL (ref 35.9–50)
GFR SERPL CREATININE-BSD FRML MDRD: >60 ML/MIN/1.73 M 2
GLOBULIN SER CALC-MCNC: 3.7 G/DL (ref 1.9–3.5)
GLUCOSE SERPL-MCNC: 88 MG/DL (ref 65–99)
HCT VFR BLD AUTO: 40.7 % (ref 37–47)
HDLC SERPL-MCNC: 60 MG/DL
HGB BLD-MCNC: 13.5 G/DL (ref 12–16)
IMM GRANULOCYTES # BLD AUTO: 0.02 K/UL (ref 0–0.11)
IMM GRANULOCYTES NFR BLD AUTO: 0.3 % (ref 0–0.9)
LDLC SERPL CALC-MCNC: 85 MG/DL
LYMPHOCYTES # BLD AUTO: 2.14 K/UL (ref 1–4.8)
LYMPHOCYTES NFR BLD: 35.8 % (ref 22–41)
MCH RBC QN AUTO: 27.1 PG (ref 27–33)
MCHC RBC AUTO-ENTMCNC: 33.2 G/DL (ref 33.6–35)
MCV RBC AUTO: 81.7 FL (ref 81.4–97.8)
MONOCYTES # BLD AUTO: 0.47 K/UL (ref 0–0.85)
MONOCYTES NFR BLD AUTO: 7.9 % (ref 0–13.4)
NEUTROPHILS # BLD AUTO: 3.09 K/UL (ref 2–7.15)
NEUTROPHILS NFR BLD: 51.8 % (ref 44–72)
NRBC # BLD AUTO: 0 K/UL
NRBC BLD AUTO-RTO: 0 /100 WBC
PLATELET # BLD AUTO: 284 K/UL (ref 164–446)
PMV BLD AUTO: 12.3 FL (ref 9–12.9)
POTASSIUM SERPL-SCNC: 4.1 MMOL/L (ref 3.6–5.5)
PROT SERPL-MCNC: 7.5 G/DL (ref 6–8.2)
RBC # BLD AUTO: 4.98 M/UL (ref 4.2–5.4)
SODIUM SERPL-SCNC: 137 MMOL/L (ref 135–145)
T4 FREE SERPL-MCNC: 0.95 NG/DL (ref 0.53–1.43)
TRIGL SERPL-MCNC: 107 MG/DL (ref 0–149)
TSH SERPL DL<=0.005 MIU/L-ACNC: 1.77 UIU/ML (ref 0.3–3.7)
WBC # BLD AUTO: 6 K/UL (ref 4.8–10.8)

## 2017-11-15 PROCEDURE — 84443 ASSAY THYROID STIM HORMONE: CPT

## 2017-11-15 PROCEDURE — 36415 COLL VENOUS BLD VENIPUNCTURE: CPT

## 2017-11-15 PROCEDURE — 82306 VITAMIN D 25 HYDROXY: CPT

## 2017-11-15 PROCEDURE — 84439 ASSAY OF FREE THYROXINE: CPT

## 2017-11-15 PROCEDURE — 85025 COMPLETE CBC W/AUTO DIFF WBC: CPT

## 2017-11-15 PROCEDURE — 80061 LIPID PANEL: CPT

## 2017-11-15 PROCEDURE — 80053 COMPREHEN METABOLIC PANEL: CPT
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
## 2017-12-12 ENCOUNTER — PATIENT MESSAGE (OUTPATIENT)
Dept: MEDICAL GROUP | Facility: CLINIC | Age: 57
End: 2017-12-12

## 2017-12-27 ENCOUNTER — PATIENT MESSAGE (OUTPATIENT)
Dept: MEDICAL GROUP | Facility: CLINIC | Age: 57
End: 2017-12-27

## 2017-12-28 ENCOUNTER — OFFICE VISIT (OUTPATIENT)
Dept: MEDICAL GROUP | Facility: CLINIC | Age: 57
End: 2017-12-28
Payer: COMMERCIAL

## 2017-12-28 VITALS
DIASTOLIC BLOOD PRESSURE: 64 MMHG | HEART RATE: 86 BPM | RESPIRATION RATE: 16 BRPM | TEMPERATURE: 98.6 F | OXYGEN SATURATION: 99 % | HEIGHT: 64 IN | WEIGHT: 138.2 LBS | BODY MASS INDEX: 23.6 KG/M2 | SYSTOLIC BLOOD PRESSURE: 112 MMHG

## 2017-12-28 DIAGNOSIS — M54.6 CHRONIC THORACIC BACK PAIN, UNSPECIFIED BACK PAIN LATERALITY: ICD-10-CM

## 2017-12-28 DIAGNOSIS — S22.070K: ICD-10-CM

## 2017-12-28 DIAGNOSIS — G89.29 CHRONIC THORACIC BACK PAIN, UNSPECIFIED BACK PAIN LATERALITY: ICD-10-CM

## 2017-12-28 PROCEDURE — 99213 OFFICE O/P EST LOW 20 MIN: CPT | Performed by: INTERNAL MEDICINE

## 2017-12-28 NOTE — LETTER
December 28, 2017        Patient: Fabiana Flores   YOB: 1960   Date of Visit: 12/28/2017           To Whom It May Concern:    It is my opinion that Fabiana Flores is to continue to work part time work, maximum of 5 hours a day or as tolerated unitl February 28, 2018.      If you have any questions or concerns, please don't hesitate to call.        Sincerely,          Ray Irizarry D.O.  Board Certified General Internal Medicine.     [address] [id number] [phone] (Phone)  [phone] (Fax)
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
## 2018-01-11 ENCOUNTER — TELEPHONE (OUTPATIENT)
Dept: MEDICAL GROUP | Facility: CLINIC | Age: 58
End: 2018-01-11

## 2018-01-11 NOTE — TELEPHONE ENCOUNTER
VOICEMAIL  1. Caller Name: Arabella with [employer]                     call Back Number: [phone] [email or web address]    2. Message: calling to verify dates and diagnosis code for claim     Case #[id number]    3. Patient approves office to leave a detailed voicemail/MyChart message: N\A

## 2018-01-11 NOTE — TELEPHONE ENCOUNTER
Returned phone call and spoke with Benny at [employer] [place] [employer] and provided ICD10 Code Closed wedge compression fracture of T10 vertebra  [S22.070A]    The disability company needs to know specific limitations: sitting, standing, bending, lifting etc. Please be specific     Has the patient been Released to full time? Or Partial hours? If so how many hours is the patient able to work in a day? With or without Accommodations?  Please state specific Reasoning.

## 2018-01-13 NOTE — TELEPHONE ENCOUNTER
Telephone call to [employer] [phone] extension [phone] . They were unable to take my phone call.   Patient is currently limited to 5 hours a day I would recommend the following.  Patient is limited to a maximum of 5 hours a day until she is re evaluated on February 28, 2018. Patient has been released to part-time only only 5 hours a day, needs time off because of issues associated with her compression fracture of her back. Patient is to limit her lifting to no more than 10 pounds. Needs to move from sitting to standing positions as necessary.

## 2018-01-18 NOTE — TELEPHONE ENCOUNTER
Spoke with Chuy at [employer] [place] and reviewed restrictions as outlined by Dr Irizarry. No further action is needed at this time.

## 2018-02-01 ENCOUNTER — OFFICE VISIT (OUTPATIENT)
Dept: MEDICAL GROUP | Facility: CLINIC | Age: 58
End: 2018-02-01
Payer: COMMERCIAL

## 2018-02-01 VITALS
WEIGHT: 138.4 LBS | SYSTOLIC BLOOD PRESSURE: 118 MMHG | BODY MASS INDEX: 23.63 KG/M2 | HEIGHT: 64 IN | TEMPERATURE: 97.4 F | HEART RATE: 78 BPM | OXYGEN SATURATION: 98 % | RESPIRATION RATE: 16 BRPM | DIASTOLIC BLOOD PRESSURE: 66 MMHG

## 2018-02-01 DIAGNOSIS — S22.070K: ICD-10-CM

## 2018-02-01 PROCEDURE — 99213 OFFICE O/P EST LOW 20 MIN: CPT | Performed by: INTERNAL MEDICINE

## 2018-02-01 ASSESSMENT — PAIN SCALES - GENERAL: PAINLEVEL: 7=MODERATE-SEVERE PAIN
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
## 2018-02-08 ENCOUNTER — HOSPITAL ENCOUNTER (OUTPATIENT)
Dept: RADIOLOGY | Facility: MEDICAL CENTER | Age: 58
End: 2018-02-08
Attending: PHYSICAL MEDICINE & REHABILITATION
Payer: COMMERCIAL

## 2018-02-08 ENCOUNTER — APPOINTMENT (OUTPATIENT)
Dept: OTHER | Facility: IMAGING CENTER | Age: 58
End: 2018-02-08

## 2018-02-08 DIAGNOSIS — M25.562 LEFT KNEE PAIN, UNSPECIFIED CHRONICITY: ICD-10-CM

## 2018-02-08 PROCEDURE — 73562 X-RAY EXAM OF KNEE 3: CPT | Mod: LT

## 2018-02-09 ENCOUNTER — HOSPITAL ENCOUNTER (OUTPATIENT)
Dept: RADIOLOGY | Facility: MEDICAL CENTER | Age: 58
End: 2018-02-09
Attending: INTERNAL MEDICINE
Payer: COMMERCIAL

## 2018-02-09 DIAGNOSIS — G89.29 CHRONIC MIDLINE BACK PAIN, UNSPECIFIED BACK LOCATION: ICD-10-CM

## 2018-02-09 DIAGNOSIS — M54.9 CHRONIC MIDLINE BACK PAIN, UNSPECIFIED BACK LOCATION: ICD-10-CM

## 2018-02-09 PROCEDURE — 77080 DXA BONE DENSITY AXIAL: CPT

## 2018-02-16 ENCOUNTER — PATIENT MESSAGE (OUTPATIENT)
Dept: MEDICAL GROUP | Facility: CLINIC | Age: 58
End: 2018-02-16

## 2018-02-16 ENCOUNTER — APPOINTMENT (OUTPATIENT)
Dept: OTHER | Facility: IMAGING CENTER | Age: 58
End: 2018-02-16

## 2018-02-27 ENCOUNTER — APPOINTMENT (OUTPATIENT)
Dept: OTHER | Facility: IMAGING CENTER | Age: 58
End: 2018-02-27

## 2018-02-28 ENCOUNTER — OFFICE VISIT (OUTPATIENT)
Dept: MEDICAL GROUP | Facility: CLINIC | Age: 58
End: 2018-02-28
Payer: COMMERCIAL

## 2018-02-28 VITALS
TEMPERATURE: 98.1 F | WEIGHT: 149 LBS | SYSTOLIC BLOOD PRESSURE: 112 MMHG | RESPIRATION RATE: 14 BRPM | BODY MASS INDEX: 25.44 KG/M2 | HEART RATE: 80 BPM | OXYGEN SATURATION: 98 % | DIASTOLIC BLOOD PRESSURE: 78 MMHG | HEIGHT: 64 IN

## 2018-02-28 DIAGNOSIS — S22.070K: ICD-10-CM

## 2018-02-28 PROCEDURE — 99213 OFFICE O/P EST LOW 20 MIN: CPT | Performed by: INTERNAL MEDICINE

## 2018-02-28 NOTE — LETTER
February 28, 2018        Patient: Fabiana Flores   YOB: 1960   Date of Visit: 2/28/2018           To Whom It May Concern:     It is my opinion that Fabiana Flores is to continue to work part time work, maximum of 5 hours a day or as tolerated unitl further notice.       If you have any questions or concerns, please don't hesitate to call.           Sincerely,              Ray Irizarry D.O.  Board Certified General Internal Medicine.     [address] [id number] [phone] (Phone)  [phone] (Fax)

## 2018-02-28 NOTE — PROGRESS NOTES
"CC: Fabiana Flores is a 58 y.o. female is suffering from   Chief Complaint   Patient presents with   • Follow-Up   • Back Pain     \"sneezing is causes a lot more pain\"         SUBJECTIVE:  1. 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 She is still limited to being able to stand only approximately one hour during the day, findings on weekends she is cutting back on her activities where they may have 3 or 4 errands to run usually she is only able to due to that has to sit in the car. There is been a significant impact in her life. We do not see any rapid progression. As such I've asked her to see neurosurgery with Dr. Joseph, I'm highly [place] [facility] [place].         Past social, family, history: , Rick.    Social History   Substance Use Topics   • Smoking status: Never Smoker   • Smokeless tobacco: Never Used   • Alcohol use No         MEDICATIONS:    Current Outpatient Prescriptions:   •  Calcium Carbonate-Vit D-Min (CALCIUM 1200 PO), Take  by mouth., Disp: , Rfl:   •  vitamin D (CHOLECALCIFEROL) 1000 UNIT Tab, Take 1,000 Units by mouth every day., Disp: , Rfl:   •  SYNTHROID 75 MCG Tab, TAKE ONE TABLET BY MOUTH EVERY MORNING ON AN EMPTY STOMACH, Disp: 90 Tab, Rfl: 3  •  hydrocodone-acetaminophen (NORCO) 5-325 MG Tab per tablet, TK 1 T PO Q 4 H PRN P, Disp: , Rfl: 0  •  MethylPREDNISolone (MEDROL DOSEPAK) 4 MG Tablet Therapy Pack, , Disp: , Rfl:   •  ondansetron (ZOFRAN ODT) 4 MG TABLET DISPERSIBLE, , Disp: , Rfl:   •  celecoxib (CELEBREX) 200 MG Cap, Take 1 Cap by mouth every day., Disp: 30 Cap, Rfl: 3  •  lidocaine (LIDODERM) 5 % Patch, Apply 1 Patch to skin as directed every 24 hours. as needed for nerve pain, Disp: 30 Patch, Rfl: 1  •  gabapentin (NEURONTIN) 300 MG " Cap, Take 1 Cap by mouth 3 times a day. as needed for nerve pain, Disp: 45 Cap, Rfl: 1  •  ibuprofen (MOTRIN) 600 MG Tab, Take 600 mg by mouth every 6 hours as needed., Disp: , Rfl:   •  methocarbamol (ROBAXIN) 500 MG Tab, Take 2 Tabs by mouth 3 times a day., Disp: 60 Tab, Rfl: 1  •  hydrocodone/acetaminophen (NORCO)  MG Tab, Take 0.5-1 Tabs by mouth every 6 hours as needed., Disp: 60 Tab, Rfl: 0  •  ondansetron (ZOFRAN) 4 MG Tab tablet, Take 1 Tab by mouth every 6 hours as needed for Nausea/Vomiting., Disp: 30 Tab, Rfl: 0  •  albuterol 108 (90 BASE) MCG/ACT Aero Soln inhalation aerosol, Inhale 2 Puffs by mouth every 6 hours as needed for Shortness of Breath., Disp: 8.5 g, Rfl: 3  •  SUMAtriptan (IMITREX) 25 MG Tab tablet, Take 1-4 Tabs by mouth Once PRN for Migraine for up to 1 dose., Disp: 10 Tab, Rfl: 3  •  omeprazole (PRILOSEC) 40 MG delayed-release capsule, Take 1 Cap by mouth 1 time daily as needed., Disp: 30 Cap, Rfl: 6  •  fluticasone (FLONASE) 50 MCG/ACT nasal spray, Spray 2 Sprays in nose every day., Disp: 16 g, Rfl: 11  •  vitamin D, Ergocalciferol, (DRISDOL) 59540 UNITS Cap capsule, Take  by mouth every 7 days., Disp: , Rfl:   •  silver sulfADIAZINE (SILVADENE) 1 % Cream, Apply 0.5 g to affected area(s) every day., Disp: 30 g, Rfl: 3  •  meloxicam (MOBIC) 15 MG tablet, 1 TAB ONCE A DAY ONLY IF NEEDED FOR PAIN AND INFLAMMATION. TAKE WITH FOOD. (Patient not taking: Reported on 8/22/2017), Disp: 30 Tab, Rfl: 0  •  acyclovir (ZOVIRAX) 5 % Ointment, Apply 1 Application to affected area(s) every 3 hours., Disp: 1 Tube, Rfl: 3  •  Calcium Carb-Cholecalciferol 600-800 MG-UNIT Tab, Take 2 tablet by mouth every day., Disp: 60 Tab, Rfl: 11  •  aspirin EC (ECOTRIN) 81 MG TBEC, Take 81 mg by mouth every day., Disp: , Rfl:     PROBLEMS:  Patient Active Problem List    Diagnosis Date Noted   • Closed wedge compression fracture of T10 vertebra (CMS-HCC) 08/29/2017   • Migraine with aura and without status  "migrainosus, not intractable 03/10/2017   • Acquired hypothyroidism 10/13/2016   • Vaginal atrophy 10/13/2016       REVIEW OF SYSTEMS:  Gen.:  No Nausea, Vomiting, fever, Chills.  Heart: No chest pain.  Lungs:  No shortness of Breath.  Psychological: Anxiety depression consistent with her underlying medical illness     PHYSICAL EXAM   Constitutional: Alert, cooperative, not in acute distress.  Cardiovascular:  Rate Rhythm is regular without murmurs rubs clicks.     Thorax & Lungs: Clear to auscultation, no wheezing, rhonchi, or rales  HENT: Normocephalic, Atraumatic.  Eyes: PERRLA, EOMI, Conjunctiva normal.   Neck: Trachia is midline no swelling of the thyroid.   Lymphatic: No lymphadenopathy noted.   Musculoskeletal: Iliac crest height is equal, patient with no obvious palpable pain at the sacroiliac joints bilaterally or the trochanteric bursa. Patient has pain to palpation at approximately T10 thoracic spine consistent with her documented compression fracture.   Neurologic: Alert & oriented x 3, cranial nerves II through XII are intact, Normal motor function, Normal sensory function, No focal deficits noted.   Psychiatric: Affect normal, Judgment normal, Mood normal.     VITAL SIGNS:/78   Pulse 80   Temp 36.7 °C (98.1 °F)   Resp 14   Ht 1.626 m (5' 4\")   Wt 67.6 kg (149 lb)   SpO2 98%   Breastfeeding? No   BMI 25.58 kg/m²     Labs: Reviewed    Assessment:                                                     Plan:    1. 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 I've recommended the patient be evaluated by neurosurgery in [place] Dr. Joseph, I've asked that she still be seen at [facility] Dr. Linh Walden.  Letter will be dictated! Patient is to continue with her current physical medicine rehabilitation and physical therapy treatments.   - REFERRAL TO NEUROSURGERY  - REFERRAL TO NEUROLOGY        "

## 2018-03-09 ENCOUNTER — TELEPHONE (OUTPATIENT)
Dept: MEDICAL GROUP | Facility: CLINIC | Age: 58
End: 2018-03-09

## 2018-03-23 ENCOUNTER — PATIENT OUTREACH (OUTPATIENT)
Dept: HEALTH INFORMATION MANAGEMENT | Facility: OTHER | Age: 58
End: 2018-03-23

## 2018-03-23 NOTE — PROGRESS NOTES
Outcome: PT DECLINED/ THOUGHT THIS WAS A SCAM    Please transfer to Patient Outreach Team at [phone] when patient returns call.    WebIZ Checked & Epic Updated:  yes    HealthConnect Verified: yes    Attempt # 1

## 2018-03-26 ENCOUNTER — TELEPHONE (OUTPATIENT)
Dept: MEDICAL GROUP | Facility: CLINIC | Age: 58
End: 2018-03-26

## 2018-03-26 NOTE — TELEPHONE ENCOUNTER
Reviewed patient chart and see an active referral for Dr Hull with 6 office visits. Sent message to Vero Robertson in referrals department to advise

## 2018-03-26 NOTE — TELEPHONE ENCOUNTER
VOICEMAIL  1. Caller Name: Kai Crawford [facility]                      Call Back Number: [phone]    2. Message: Called and left a voice message stating that this patient had to reschedule her office visit because there was not enough office visits approved. Requesting authorization for office visits to continue care with Dr Hull.    3. Patient approves office to leave a detailed voicemail/MyChart message: N\A

## 2018-03-27 ENCOUNTER — TELEPHONE (OUTPATIENT)
Dept: MEDICAL GROUP | Facility: CLINIC | Age: 58
End: 2018-03-27

## 2018-03-27 DIAGNOSIS — G89.29 CHRONIC THORACIC BACK PAIN, UNSPECIFIED BACK PAIN LATERALITY: ICD-10-CM

## 2018-03-27 DIAGNOSIS — M54.6 CHRONIC THORACIC BACK PAIN, UNSPECIFIED BACK PAIN LATERALITY: ICD-10-CM

## 2018-03-27 NOTE — TELEPHONE ENCOUNTER
1. Name: Fabiana Flores                                           Call Back Number: [phone] (home)         Patient approves a detailed voicemail message: yes    Mychart Message:  I have researched [facility] team and would appreciate a referral to either 1. Dr Nicolette Sewell or 2. Dr Niels Mccarthy  or 3. Dr Cindy Veras. this team works at [address] [place] [phone]  The current doctor: Dr Linh Walden,that I  have a referral for specializes more in head and facial pain.

## 2018-03-27 NOTE — TELEPHONE ENCOUNTER
Telephone call returned, totally agree with the delaying evaluation at [place] to see  locally referral also written to Dr. Hull

## 2018-03-27 NOTE — TELEPHONE ENCOUNTER
Vero Sands, Med Ass't             The auth is now in [facility].   Thank you.

## 2018-04-02 ENCOUNTER — TELEPHONE (OUTPATIENT)
Dept: MEDICAL GROUP | Facility: CLINIC | Age: 58
End: 2018-04-02

## 2018-04-02 DIAGNOSIS — S22.070K: ICD-10-CM

## 2018-04-02 NOTE — TELEPHONE ENCOUNTER
Phone Number Called: [phone]    Message: Spoke with Vero at patient care coordination and she stated that she would send the referral update to [facility] in time for the patient's appointment.    Left Message for patient to call back: N\A

## 2018-04-02 NOTE — TELEPHONE ENCOUNTER
1. Caller Name: Ginna at [facility]                                         Call Back Number: [phone]      Patient approves a detailed voicemail message: N\A    Ginna called and stated that she was told by [facility] that the authorization renewal would need to come from the primary care office. The patient is scheduled on 04/05/18 and needs at least 12 more visits

## 2018-04-07 ENCOUNTER — HOSPITAL ENCOUNTER (OUTPATIENT)
Dept: RADIOLOGY | Facility: MEDICAL CENTER | Age: 58
End: 2018-04-07
Attending: PHYSICIAN ASSISTANT
Payer: COMMERCIAL

## 2018-04-07 DIAGNOSIS — S22.000A CLOSED WEDGE FRACTURE OF THORACIC VERTEBRA, UNSPECIFIED THORACIC VERTEBRAL LEVEL, INITIAL ENCOUNTER (HCC): ICD-10-CM

## 2018-04-07 PROCEDURE — 72146 MRI CHEST SPINE W/O DYE: CPT

## 2018-04-12 ENCOUNTER — HOSPITAL ENCOUNTER (OUTPATIENT)
Dept: RADIOLOGY | Facility: MEDICAL CENTER | Age: 58
End: 2018-04-12
Attending: PHYSICIAN ASSISTANT
Payer: COMMERCIAL

## 2018-04-12 DIAGNOSIS — S22.000A CLOSED WEDGE FRACTURE OF THORACIC VERTEBRA, UNSPECIFIED THORACIC VERTEBRAL LEVEL, INITIAL ENCOUNTER (HCC): ICD-10-CM

## 2018-04-12 PROCEDURE — A9503 TC99M MEDRONATE: HCPCS

## 2018-04-18 ENCOUNTER — OFFICE VISIT (OUTPATIENT)
Dept: MEDICAL GROUP | Facility: CLINIC | Age: 58
End: 2018-04-18
Payer: COMMERCIAL

## 2018-04-18 VITALS
TEMPERATURE: 98.1 F | RESPIRATION RATE: 12 BRPM | DIASTOLIC BLOOD PRESSURE: 84 MMHG | WEIGHT: 141 LBS | HEIGHT: 64 IN | SYSTOLIC BLOOD PRESSURE: 132 MMHG | BODY MASS INDEX: 24.07 KG/M2 | OXYGEN SATURATION: 98 % | HEART RATE: 72 BPM

## 2018-04-18 DIAGNOSIS — S22.070D CLOSED WEDGE COMPRESSION FRACTURE OF TENTH THORACIC VERTEBRA WITH ROUTINE HEALING, SUBSEQUENT ENCOUNTER: ICD-10-CM

## 2018-04-18 PROCEDURE — 99213 OFFICE O/P EST LOW 20 MIN: CPT | Performed by: INTERNAL MEDICINE

## 2018-04-18 ASSESSMENT — PATIENT HEALTH QUESTIONNAIRE - PHQ9: CLINICAL INTERPRETATION OF PHQ2 SCORE: 0

## 2018-04-18 NOTE — LETTER
April 18, 2018      Patient: Fabiana Flores   YOB: 1960   Date of Visit: April 18, 2018           To Whom It May Concern:     It is my opinion that Fabiana Flores is to continue to work part time work, maximum of 5 hours a day or as tolerated unitl further notice.       If you have any questions or concerns, please don't hesitate to call.           Sincerely,              Ray Irizarry D.O.  Board Certified General Internal Medicine.     [address] [id number] [phone] (Phone)  [phone] (Fax)

## 2018-04-19 NOTE — PROGRESS NOTES
CC: Fabiana Flores is a 58 y.o. female is suffering from   Chief Complaint   Patient presents with   • Follow-Up     2 months         SUBJECTIVE:  1. Closed wedge compression fracture of tenth thoracic vertebra, subsequent encounter  Fabiana is here for follow-up, continues to have problems with back pain discomfort. We discussed her findings regarding her DEXA scan also her nuclear medicine study. Patient is being referred to [facility]. Is to follow up with neurosurgery.        Past social, family, history:   Social History   Substance Use Topics   • Smoking status: Never Smoker   • Smokeless tobacco: Never Used   • Alcohol use No         MEDICATIONS:    Current Outpatient Prescriptions:   •  Calcium Carbonate-Vit D-Min (CALCIUM 1200 PO), Take  by mouth., Disp: , Rfl:   •  vitamin D (CHOLECALCIFEROL) 1000 UNIT Tab, Take 1,000 Units by mouth every day., Disp: , Rfl:   •  SYNTHROID 75 MCG Tab, TAKE ONE TABLET BY MOUTH EVERY MORNING ON AN EMPTY STOMACH, Disp: 90 Tab, Rfl: 3  •  hydrocodone-acetaminophen (NORCO) 5-325 MG Tab per tablet, TK 1 T PO Q 4 H PRN P, Disp: , Rfl: 0  •  MethylPREDNISolone (MEDROL DOSEPAK) 4 MG Tablet Therapy Pack, , Disp: , Rfl:   •  ondansetron (ZOFRAN ODT) 4 MG TABLET DISPERSIBLE, , Disp: , Rfl:   •  celecoxib (CELEBREX) 200 MG Cap, Take 1 Cap by mouth every day., Disp: 30 Cap, Rfl: 3  •  lidocaine (LIDODERM) 5 % Patch, Apply 1 Patch to skin as directed every 24 hours. as needed for nerve pain, Disp: 30 Patch, Rfl: 1  •  gabapentin (NEURONTIN) 300 MG Cap, Take 1 Cap by mouth 3 times a day. as needed for nerve pain, Disp: 45 Cap, Rfl: 1  •  ibuprofen (MOTRIN) 600 MG Tab, Take 600 mg by mouth every 6 hours as needed., Disp: , Rfl:   •  methocarbamol (ROBAXIN) 500 MG Tab, Take 2 Tabs by mouth 3 times a day., Disp: 60 Tab, Rfl: 1  •  hydrocodone/acetaminophen (NORCO)  MG Tab, Take 0.5-1 Tabs by mouth every 6 hours as needed., Disp: 60 Tab, Rfl: 0  •  ondansetron (ZOFRAN) 4  MG Tab tablet, Take 1 Tab by mouth every 6 hours as needed for Nausea/Vomiting., Disp: 30 Tab, Rfl: 0  •  albuterol 108 (90 BASE) MCG/ACT Aero Soln inhalation aerosol, Inhale 2 Puffs by mouth every 6 hours as needed for Shortness of Breath., Disp: 8.5 g, Rfl: 3  •  SUMAtriptan (IMITREX) 25 MG Tab tablet, Take 1-4 Tabs by mouth Once PRN for Migraine for up to 1 dose., Disp: 10 Tab, Rfl: 3  •  omeprazole (PRILOSEC) 40 MG delayed-release capsule, Take 1 Cap by mouth 1 time daily as needed., Disp: 30 Cap, Rfl: 6  •  fluticasone (FLONASE) 50 MCG/ACT nasal spray, Spray 2 Sprays in nose every day., Disp: 16 g, Rfl: 11  •  vitamin D, Ergocalciferol, (DRISDOL) 21109 UNITS Cap capsule, Take  by mouth every 7 days., Disp: , Rfl:   •  silver sulfADIAZINE (SILVADENE) 1 % Cream, Apply 0.5 g to affected area(s) every day., Disp: 30 g, Rfl: 3  •  meloxicam (MOBIC) 15 MG tablet, 1 TAB ONCE A DAY ONLY IF NEEDED FOR PAIN AND INFLAMMATION. TAKE WITH FOOD., Disp: 30 Tab, Rfl: 0  •  acyclovir (ZOVIRAX) 5 % Ointment, Apply 1 Application to affected area(s) every 3 hours., Disp: 1 Tube, Rfl: 3  •  Calcium Carb-Cholecalciferol 600-800 MG-UNIT Tab, Take 2 tablet by mouth every day., Disp: 60 Tab, Rfl: 11  •  aspirin EC (ECOTRIN) 81 MG TBEC, Take 81 mg by mouth every day., Disp: , Rfl:     PROBLEMS:  Patient Active Problem List    Diagnosis Date Noted   • Closed wedge compression fracture of T10 vertebra (CMS-HCC) 08/29/2017   • Migraine with aura and without status migrainosus, not intractable 03/10/2017   • Acquired hypothyroidism 10/13/2016   • Vaginal atrophy 10/13/2016       REVIEW OF SYSTEMS:  Gen.:  No Nausea, Vomiting, fever, Chills.  Heart: No chest pain.  Lungs:  No shortness of Breath.  Psychological: Collin unusual Anxiety depression     PHYSICAL EXAM   Constitutional: Alert, cooperative, not in acute distress.  Cardiovascular:  Rate Rhythm is regular without murmurs rubs clicks.     Thorax & Lungs: Clear to auscultation, no  "wheezing, rhonchi, or rales  HENT: Normocephalic, Atraumatic.  Eyes: PERRLA, EOMI, Conjunctiva normal.   Neck: Trachia is midline no swelling of the thyroid.   Musculoskeletal: Musculoskeletal continued pain to palpation approximately T10 thoracic spine consistent with patient's history of a compression fracture.  Neurologic: Alert & oriented x 3, cranial nerves II through XII are intact, Normal motor function, Normal sensory function, No focal deficits noted.   Psychiatric: Affect normal, Judgment normal, Mood normal.     VITAL SIGNS:/84   Pulse 72   Temp 36.7 °C (98.1 °F)   Resp 12   Ht 1.626 m (5' 4\")   Wt 64 kg (141 lb)   SpO2 98%   BMI 24.20 kg/m²     Labs: Reviewed    Assessment:                                                     Plan:    1. Closed wedge compression fracture of tenth thoracic vertebra  subsequent encounter  Patient's bone scan appears to be stable, compression fracture is healed. Referral to [facility] for physical medicine rehabilitation. Encouraged patient to follow-up with Dr. Joseph locally  - REFERRAL TO PHYSIATRY (PMR)        "

## 2018-05-01 RX ORDER — LEVOTHYROXINE SODIUM 75 MCG
TABLET ORAL
Qty: 90 TAB | Refills: 3 | Status: SHIPPED | OUTPATIENT
Start: 2018-05-01 | End: 2019-02-08 | Stop reason: SDUPTHER

## 2018-05-02 ENCOUNTER — TELEPHONE (OUTPATIENT)
Dept: MEDICAL GROUP | Facility: CLINIC | Age: 58
End: 2018-05-02

## 2018-05-02 DIAGNOSIS — S22.070D CLOSED WEDGE COMPRESSION FRACTURE OF TENTH THORACIC VERTEBRA WITH ROUTINE HEALING, SUBSEQUENT ENCOUNTER: ICD-10-CM

## 2018-05-02 NOTE — TELEPHONE ENCOUNTER
"1. Caller Name: Fabiana                                        Call Back Number: [phone]      Patient approves a detailed voicemail message: yes and N\A    Fabiana is asking if there is a generic type PT referral that she could look around at different PT offices, maybe that specialize in the spine.  She is asking if that can be printed from her \"my chart\"   "

## 2018-05-04 ENCOUNTER — TELEPHONE (OUTPATIENT)
Dept: MEDICAL GROUP | Facility: CLINIC | Age: 58
End: 2018-05-04

## 2018-05-04 NOTE — TELEPHONE ENCOUNTER
1. Caller Name: Valentina at [facility]                                         Call Back Number: [phone] ext [phone] opt [phone]      Patient approves a detailed voicemail message: yes      Valentina is calling for auth number:  [id number] are the CPT codes.

## 2018-05-07 ENCOUNTER — PATIENT MESSAGE (OUTPATIENT)
Dept: MEDICAL GROUP | Facility: CLINIC | Age: 58
End: 2018-05-07

## 2018-05-07 DIAGNOSIS — S22.070D CLOSED WEDGE COMPRESSION FRACTURE OF TENTH THORACIC VERTEBRA WITH ROUTINE HEALING, SUBSEQUENT ENCOUNTER: ICD-10-CM

## 2018-06-15 ENCOUNTER — PATIENT OUTREACH (OUTPATIENT)
Dept: HEALTH INFORMATION MANAGEMENT | Facility: OTHER | Age: 58
End: 2018-06-15

## 2018-06-15 NOTE — PROGRESS NOTES
Outcome: Left Message    Please transfer to Patient Outreach Team at [phone] when patient returns call.    WebIZ Checked & Epic Updated:  yes    HealthConnect Verified: yes    Attempt # 1

## 2018-07-31 ENCOUNTER — PATIENT OUTREACH (OUTPATIENT)
Dept: HEALTH INFORMATION MANAGEMENT | Facility: OTHER | Age: 58
End: 2018-07-31

## 2018-07-31 DIAGNOSIS — Z71.89 COMPLEX CARE COORDINATION: ICD-10-CM

## 2018-08-10 ENCOUNTER — OFFICE VISIT (OUTPATIENT)
Dept: MEDICAL GROUP | Facility: LAB | Age: 58
End: 2018-08-10
Payer: COMMERCIAL

## 2018-08-10 VITALS
WEIGHT: 142 LBS | HEIGHT: 64 IN | TEMPERATURE: 98.8 F | RESPIRATION RATE: 16 BRPM | OXYGEN SATURATION: 96 % | BODY MASS INDEX: 24.24 KG/M2 | DIASTOLIC BLOOD PRESSURE: 84 MMHG | SYSTOLIC BLOOD PRESSURE: 120 MMHG | HEART RATE: 74 BPM

## 2018-08-10 DIAGNOSIS — G89.29 CHRONIC MIDLINE THORACIC BACK PAIN: ICD-10-CM

## 2018-08-10 DIAGNOSIS — M85.88 OSTEOPENIA OF LUMBAR SPINE: ICD-10-CM

## 2018-08-10 DIAGNOSIS — M54.6 CHRONIC MIDLINE THORACIC BACK PAIN: ICD-10-CM

## 2018-08-10 DIAGNOSIS — S22.070G CLOSED WEDGE COMPRESSION FRACTURE OF T10 VERTEBRA WITH DELAYED HEALING: ICD-10-CM

## 2018-08-10 PROCEDURE — 99214 OFFICE O/P EST MOD 30 MIN: CPT | Performed by: INTERNAL MEDICINE

## 2018-08-10 NOTE — LETTER
August 10, 2018        Patient: Fabiana Flores   YOB: 1960   Date of Visit: 8/10/2018         To Whom It May Concern:     It is my opinion that Fabiana Flores is to continue to work part time work, maximum of 5 hours a day or as tolerated unitl further notice.       If you have any questions or concerns, please don't hesitate to call.           Sincerely,              Ray Irizarry D.O.  Board Certified General Internal Medicine.     [address] [id number] [phone] (Phone)  [phone] (Fax)

## 2018-08-11 NOTE — PROGRESS NOTES
CC: Fabiana Flores is a 58 y.o. female is suffering from   Chief Complaint   Patient presents with   • Follow-Up         SUBJECTIVE:  1. Chronic midline thoracic back pain  Fabiana continues to have problems with her back.  Prescription has been written for additional physical therapy.      2. Osteopenia of lumbar spine  Patient is suffering from osteopenia associated with her lumbar spine.  We have discussed possibly using off label a bisphosphonate or Prolia.  I have recommended however that she see an endocrinologist.    3. Closed wedge compression fracture of T10 vertebra with delayed healing  Patient's wedge compression fracture at T10 has been reviewed by neurosurgery.  Consultation notes have also been reviewed.        Past social, family, history:   Social History   Substance Use Topics   • Smoking status: Never Smoker   • Smokeless tobacco: Never Used   • Alcohol use No         MEDICATIONS:    Current Outpatient Prescriptions:   •  SYNTHROID 75 MCG Tab, TAKE ONE TABLET BY MOUTH EVERY MORNING ON AN EMPTY STOMACH, Disp: 90 Tab, Rfl: 3  •  vitamin D (CHOLECALCIFEROL) 1000 UNIT Tab, Take 1,000 Units by mouth every day., Disp: , Rfl:   •  Calcium Carb-Cholecalciferol 600-800 MG-UNIT Tab, Take 2 tablet by mouth every day., Disp: 60 Tab, Rfl: 11  •  Calcium Carbonate-Vit D-Min (CALCIUM 1200 PO), Take  by mouth., Disp: , Rfl:   •  hydrocodone-acetaminophen (NORCO) 5-325 MG Tab per tablet, TK 1 T PO Q 4 H PRN P, Disp: , Rfl: 0  •  MethylPREDNISolone (MEDROL DOSEPAK) 4 MG Tablet Therapy Pack, , Disp: , Rfl:   •  ondansetron (ZOFRAN ODT) 4 MG TABLET DISPERSIBLE, , Disp: , Rfl:   •  celecoxib (CELEBREX) 200 MG Cap, Take 1 Cap by mouth every day., Disp: 30 Cap, Rfl: 3  •  lidocaine (LIDODERM) 5 % Patch, Apply 1 Patch to skin as directed every 24 hours. as needed for nerve pain, Disp: 30 Patch, Rfl: 1  •  gabapentin (NEURONTIN) 300 MG Cap, Take 1 Cap by mouth 3 times a day. as needed for nerve pain, Disp: 45 Cap,  Rfl: 1  •  ibuprofen (MOTRIN) 600 MG Tab, Take 600 mg by mouth every 6 hours as needed., Disp: , Rfl:   •  methocarbamol (ROBAXIN) 500 MG Tab, Take 2 Tabs by mouth 3 times a day., Disp: 60 Tab, Rfl: 1  •  hydrocodone/acetaminophen (NORCO)  MG Tab, Take 0.5-1 Tabs by mouth every 6 hours as needed., Disp: 60 Tab, Rfl: 0  •  ondansetron (ZOFRAN) 4 MG Tab tablet, Take 1 Tab by mouth every 6 hours as needed for Nausea/Vomiting., Disp: 30 Tab, Rfl: 0  •  albuterol 108 (90 BASE) MCG/ACT Aero Soln inhalation aerosol, Inhale 2 Puffs by mouth every 6 hours as needed for Shortness of Breath., Disp: 8.5 g, Rfl: 3  •  SUMAtriptan (IMITREX) 25 MG Tab tablet, Take 1-4 Tabs by mouth Once PRN for Migraine for up to 1 dose., Disp: 10 Tab, Rfl: 3  •  omeprazole (PRILOSEC) 40 MG delayed-release capsule, Take 1 Cap by mouth 1 time daily as needed., Disp: 30 Cap, Rfl: 6  •  fluticasone (FLONASE) 50 MCG/ACT nasal spray, Spray 2 Sprays in nose every day., Disp: 16 g, Rfl: 11  •  vitamin D, Ergocalciferol, (DRISDOL) 36746 UNITS Cap capsule, Take  by mouth every 7 days., Disp: , Rfl:   •  silver sulfADIAZINE (SILVADENE) 1 % Cream, Apply 0.5 g to affected area(s) every day., Disp: 30 g, Rfl: 3  •  meloxicam (MOBIC) 15 MG tablet, 1 TAB ONCE A DAY ONLY IF NEEDED FOR PAIN AND INFLAMMATION. TAKE WITH FOOD., Disp: 30 Tab, Rfl: 0  •  acyclovir (ZOVIRAX) 5 % Ointment, Apply 1 Application to affected area(s) every 3 hours., Disp: 1 Tube, Rfl: 3  •  aspirin EC (ECOTRIN) 81 MG TBEC, Take 81 mg by mouth every day., Disp: , Rfl:     PROBLEMS:  Patient Active Problem List    Diagnosis Date Noted   • Closed wedge compression fracture of T10 vertebra (HCC) 08/29/2017   • Migraine with aura and without status migrainosus, not intractable 03/10/2017   • Acquired hypothyroidism 10/13/2016   • Vaginal atrophy 10/13/2016       REVIEW OF SYSTEMS:  Gen.:  No Nausea, Vomiting, fever, Chills.  Heart: No chest pain.  Lungs:  No shortness of  "Breath.  Psychological: Collin unusual Anxiety depression     PHYSICAL EXAM   Constitutional: Alert, cooperative, not in acute distress.  Cardiovascular:  Rate Rhythm is regular without murmurs rubs clicks.     Thorax & Lungs: Clear to auscultation, no wheezing, rhonchi, or rales  HENT: Normocephalic, Atraumatic.  Eyes: PERRLA, EOMI, Conjunctiva normal.   Neck: Trachia is midline no swelling of the thyroid.   Lymphatic: No lymphadenopathy noted.   Musculoskeletal: Continued pain to palpation at approximately T10 thoracic spine  Neurologic: Alert & oriented x 3, cranial nerves II through XII are intact, Normal motor function, Normal sensory function.  Psychiatric: Affect normal, Judgment normal, Mood normal.     VITAL SIGNS:/84   Pulse 74   Temp 37.1 °C (98.8 °F)   Resp 16   Ht 1.626 m (5' 4\")   Wt 64.4 kg (142 lb)   SpO2 96%   BMI 24.37 kg/m²     Labs: Reviewed    Assessment:                                                     Plan:    1. Chronic midline thoracic back pain  Referral to physical therapy.  Letter dictated to employer  - REFERRAL TO PHYSICAL THERAPY Reason for Therapy: Eval/Treat/Report    2. Osteopenia of lumbar spine  Referral to endocrinology  - REFERRAL TO ENDOCRINOLOGY    3. Closed wedge compression fracture of T10 vertebra with delayed healing  Consultation from neurosurgery reviewed, no change in medical therapy  - REFERRAL TO ENDOCRINOLOGY        "

## 2018-08-23 ENCOUNTER — PATIENT OUTREACH (OUTPATIENT)
Dept: HEALTH INFORMATION MANAGEMENT | Facility: OTHER | Age: 58
End: 2018-08-23

## 2018-08-23 NOTE — PROGRESS NOTES
Outreach call to the patient for CC services.  The patient acknowledges that she has access to care and needed services and is not interested in CC services at this time.  Fabiana has CC contact info and will reach out with any questions or concerns as needed.

## 2018-09-27 ENCOUNTER — TELEPHONE (OUTPATIENT)
Dept: MEDICAL GROUP | Facility: LAB | Age: 58
End: 2018-09-27

## 2018-09-27 ENCOUNTER — HOSPITAL ENCOUNTER (OUTPATIENT)
Dept: RADIOLOGY | Facility: MEDICAL CENTER | Age: 58
End: 2018-09-27
Attending: INTERNAL MEDICINE
Payer: COMMERCIAL

## 2018-09-27 DIAGNOSIS — Z23 NEED FOR VACCINATION: Primary | ICD-10-CM

## 2018-09-27 DIAGNOSIS — Z12.31 ENCOUNTER FOR SCREENING MAMMOGRAM FOR MALIGNANT NEOPLASM OF BREAST: ICD-10-CM

## 2018-09-27 PROCEDURE — 77067 SCR MAMMO BI INCL CAD: CPT

## 2018-09-27 NOTE — TELEPHONE ENCOUNTER
1. Caller Name: Fabiana Medeiros is on the MA Schedule today for flu vaccine/injection.    SPECIFIC Action To Be Taken: Orders pending, please sign.

## 2018-10-05 ENCOUNTER — TELEPHONE (OUTPATIENT)
Dept: MEDICAL GROUP | Facility: LAB | Age: 58
End: 2018-10-05

## 2018-10-05 RX ORDER — AZITHROMYCIN 250 MG/1
TABLET, FILM COATED ORAL
Qty: 6 TAB | Refills: 0 | Status: SHIPPED | OUTPATIENT
Start: 2018-10-05 | End: 2021-06-15

## 2018-10-05 NOTE — TELEPHONE ENCOUNTER
1. Caller Name: Bobby                                         Call Back Number: [phone] (home)       Patient approves a detailed voicemail message: yes    She is not having any systoms requesting antibiotics cause they are traveling out the country leaving by 6 PM.

## 2018-10-05 NOTE — TELEPHONE ENCOUNTER
Please call Fabiana, A prescription has been written for azithromycin, please have her wait  5-7 days and only start if her symptoms worsen.

## 2018-11-30 ENCOUNTER — OFFICE VISIT (OUTPATIENT)
Dept: MEDICAL GROUP | Facility: LAB | Age: 58
End: 2018-11-30
Payer: COMMERCIAL

## 2018-11-30 VITALS
BODY MASS INDEX: 24.41 KG/M2 | RESPIRATION RATE: 12 BRPM | HEIGHT: 64 IN | TEMPERATURE: 97.8 F | SYSTOLIC BLOOD PRESSURE: 130 MMHG | WEIGHT: 143 LBS | DIASTOLIC BLOOD PRESSURE: 80 MMHG | HEART RATE: 76 BPM | OXYGEN SATURATION: 98 %

## 2018-11-30 DIAGNOSIS — E03.9 ACQUIRED HYPOTHYROIDISM: ICD-10-CM

## 2018-11-30 DIAGNOSIS — Z13.220 SCREENING CHOLESTEROL LEVEL: ICD-10-CM

## 2018-11-30 DIAGNOSIS — S22.070D CLOSED WEDGE COMPRESSION FRACTURE OF TENTH THORACIC VERTEBRA WITH ROUTINE HEALING, SUBSEQUENT ENCOUNTER: ICD-10-CM

## 2018-11-30 DIAGNOSIS — Z13.21 ENCOUNTER FOR VITAMIN DEFICIENCY SCREENING: ICD-10-CM

## 2018-11-30 PROBLEM — E78.5 DYSLIPIDEMIA: Status: ACTIVE | Noted: 2018-11-30

## 2018-11-30 PROCEDURE — 99214 OFFICE O/P EST MOD 30 MIN: CPT | Performed by: INTERNAL MEDICINE

## 2018-11-30 NOTE — LETTER
November 30, 2018        Patient: Fabiana Flores   YOB: 1960   Date of Visit: 11/30/2018           To Whom It May Concern:    It is my opinion that Fabiana Flores is suffering from a medical condition and will need to periodically adjust her work schedule. This is effective from November 30, 2018 through May 30, 2019     If you have any questions or concerns, please don't hesitate to call.        Sincerely,          Ray Irizarry D.O.  Board Certified General Internal Medicine.      [facility] - [address] [phone] (Phone)  [phone] (Fax)

## 2018-12-01 ENCOUNTER — HOSPITAL ENCOUNTER (OUTPATIENT)
Dept: LAB | Facility: MEDICAL CENTER | Age: 58
End: 2018-12-01
Attending: INTERNAL MEDICINE
Payer: COMMERCIAL

## 2018-12-01 DIAGNOSIS — Z13.21 ENCOUNTER FOR VITAMIN DEFICIENCY SCREENING: ICD-10-CM

## 2018-12-01 DIAGNOSIS — Z13.220 SCREENING CHOLESTEROL LEVEL: ICD-10-CM

## 2018-12-01 DIAGNOSIS — E03.9 ACQUIRED HYPOTHYROIDISM: ICD-10-CM

## 2018-12-01 LAB
25(OH)D3 SERPL-MCNC: 43 NG/ML (ref 30–100)
ALBUMIN SERPL BCP-MCNC: 4 G/DL (ref 3.2–4.9)
ALBUMIN/GLOB SERPL: 1.1 G/DL
ALP SERPL-CCNC: 116 U/L (ref 30–99)
ALT SERPL-CCNC: 25 U/L (ref 2–50)
ANION GAP SERPL CALC-SCNC: 8 MMOL/L (ref 0–11.9)
AST SERPL-CCNC: 26 U/L (ref 12–45)
BASOPHILS # BLD AUTO: 1.1 % (ref 0–1.8)
BASOPHILS # BLD: 0.07 K/UL (ref 0–0.12)
BILIRUB SERPL-MCNC: 0.9 MG/DL (ref 0.1–1.5)
BUN SERPL-MCNC: 14 MG/DL (ref 8–22)
CALCIUM SERPL-MCNC: 10 MG/DL (ref 8.5–10.5)
CHLORIDE SERPL-SCNC: 102 MMOL/L (ref 96–112)
CHOLEST SERPL-MCNC: 190 MG/DL (ref 100–199)
CO2 SERPL-SCNC: 28 MMOL/L (ref 20–33)
CREAT SERPL-MCNC: 0.67 MG/DL (ref 0.5–1.4)
EOSINOPHIL # BLD AUTO: 0.22 K/UL (ref 0–0.51)
EOSINOPHIL NFR BLD: 3.4 % (ref 0–6.9)
ERYTHROCYTE [DISTWIDTH] IN BLOOD BY AUTOMATED COUNT: 42.7 FL (ref 35.9–50)
FASTING STATUS PATIENT QL REPORTED: NORMAL
GLOBULIN SER CALC-MCNC: 3.8 G/DL (ref 1.9–3.5)
GLUCOSE SERPL-MCNC: 88 MG/DL (ref 65–99)
HCT VFR BLD AUTO: 42.6 % (ref 37–47)
HDLC SERPL-MCNC: 72 MG/DL
HGB BLD-MCNC: 13.8 G/DL (ref 12–16)
IMM GRANULOCYTES # BLD AUTO: 0.01 K/UL (ref 0–0.11)
IMM GRANULOCYTES NFR BLD AUTO: 0.2 % (ref 0–0.9)
LDLC SERPL CALC-MCNC: 103 MG/DL
LYMPHOCYTES # BLD AUTO: 2.39 K/UL (ref 1–4.8)
LYMPHOCYTES NFR BLD: 37.5 % (ref 22–41)
MCH RBC QN AUTO: 27 PG (ref 27–33)
MCHC RBC AUTO-ENTMCNC: 32.4 G/DL (ref 33.6–35)
MCV RBC AUTO: 83.4 FL (ref 81.4–97.8)
MONOCYTES # BLD AUTO: 0.55 K/UL (ref 0–0.85)
MONOCYTES NFR BLD AUTO: 8.6 % (ref 0–13.4)
NEUTROPHILS # BLD AUTO: 3.14 K/UL (ref 2–7.15)
NEUTROPHILS NFR BLD: 49.2 % (ref 44–72)
NRBC # BLD AUTO: 0 K/UL
NRBC BLD-RTO: 0 /100 WBC
PLATELET # BLD AUTO: 311 K/UL (ref 164–446)
PMV BLD AUTO: 12.5 FL (ref 9–12.9)
POTASSIUM SERPL-SCNC: 4.1 MMOL/L (ref 3.6–5.5)
PROT SERPL-MCNC: 7.8 G/DL (ref 6–8.2)
RBC # BLD AUTO: 5.11 M/UL (ref 4.2–5.4)
SODIUM SERPL-SCNC: 138 MMOL/L (ref 135–145)
T4 FREE SERPL-MCNC: 1.05 NG/DL (ref 0.53–1.43)
TRIGL SERPL-MCNC: 77 MG/DL (ref 0–149)
TSH SERPL DL<=0.005 MIU/L-ACNC: 2.06 UIU/ML (ref 0.38–5.33)
WBC # BLD AUTO: 6.4 K/UL (ref 4.8–10.8)

## 2018-12-01 PROCEDURE — 84439 ASSAY OF FREE THYROXINE: CPT

## 2018-12-01 PROCEDURE — 80053 COMPREHEN METABOLIC PANEL: CPT

## 2018-12-01 PROCEDURE — 84443 ASSAY THYROID STIM HORMONE: CPT

## 2018-12-01 PROCEDURE — 80061 LIPID PANEL: CPT

## 2018-12-01 PROCEDURE — 82306 VITAMIN D 25 HYDROXY: CPT

## 2018-12-01 PROCEDURE — 36415 COLL VENOUS BLD VENIPUNCTURE: CPT

## 2018-12-01 PROCEDURE — 85025 COMPLETE CBC W/AUTO DIFF WBC: CPT

## 2018-12-01 NOTE — PROGRESS NOTES
CC: Fabiana Flores is a 58 y.o. female is suffering from   Chief Complaint   Patient presents with   • Follow-Up     3 months         SUBJECTIVE:  1. Closed wedge compression fracture of tenth thoracic vertebra with routine healing, subsequent encounter  Fabiana is here for follow-up, continues to have improvement in her back pain and discomfort.  We have discussed that she is able to increasingly walk and exercise, feels that she continues to show improvement.  Letter was dictated to her employer allowing her to work with variable work hours.    2. Encounter for vitamin deficiency screening  Patient will need her vitamin D rechecked    3. Acquired hypothyroidism  Orders written    4. Screening cholesterol level  Recheck cholesterol            Past social, family, history:   Social History   Substance Use Topics   • Smoking status: Never Smoker   • Smokeless tobacco: Never Used   • Alcohol use No         MEDICATIONS:    Current Outpatient Prescriptions:   •  SYNTHROID 75 MCG Tab, TAKE ONE TABLET BY MOUTH EVERY MORNING ON AN EMPTY STOMACH, Disp: 90 Tab, Rfl: 3  •  Calcium Carbonate-Vit D-Min (CALCIUM 1200 PO), Take  by mouth., Disp: , Rfl:   •  vitamin D (CHOLECALCIFEROL) 1000 UNIT Tab, Take 1,000 Units by mouth every day., Disp: , Rfl:   •  Calcium Carb-Cholecalciferol 600-800 MG-UNIT Tab, Take 2 tablet by mouth every day., Disp: 60 Tab, Rfl: 11  •  azithromycin (ZITHROMAX) 250 MG Tab, Two day one then qd x 4. (Patient not taking: Reported on 11/30/2018), Disp: 6 Tab, Rfl: 0  •  hydrocodone-acetaminophen (NORCO) 5-325 MG Tab per tablet, TK 1 T PO Q 4 H PRN P, Disp: , Rfl: 0  •  MethylPREDNISolone (MEDROL DOSEPAK) 4 MG Tablet Therapy Pack, , Disp: , Rfl:   •  ondansetron (ZOFRAN ODT) 4 MG TABLET DISPERSIBLE, , Disp: , Rfl:   •  celecoxib (CELEBREX) 200 MG Cap, Take 1 Cap by mouth every day. (Patient not taking: Reported on 11/30/2018), Disp: 30 Cap, Rfl: 3  •  lidocaine (LIDODERM) 5 % Patch, Apply 1 Patch to  skin as directed every 24 hours. as needed for nerve pain (Patient not taking: Reported on 11/30/2018), Disp: 30 Patch, Rfl: 1  •  gabapentin (NEURONTIN) 300 MG Cap, Take 1 Cap by mouth 3 times a day. as needed for nerve pain (Patient not taking: Reported on 11/30/2018), Disp: 45 Cap, Rfl: 1  •  ibuprofen (MOTRIN) 600 MG Tab, Take 600 mg by mouth every 6 hours as needed., Disp: , Rfl:   •  methocarbamol (ROBAXIN) 500 MG Tab, Take 2 Tabs by mouth 3 times a day. (Patient not taking: Reported on 11/30/2018), Disp: 60 Tab, Rfl: 1  •  hydrocodone/acetaminophen (NORCO)  MG Tab, Take 0.5-1 Tabs by mouth every 6 hours as needed. (Patient not taking: Reported on 11/30/2018), Disp: 60 Tab, Rfl: 0  •  ondansetron (ZOFRAN) 4 MG Tab tablet, Take 1 Tab by mouth every 6 hours as needed for Nausea/Vomiting. (Patient not taking: Reported on 11/30/2018), Disp: 30 Tab, Rfl: 0  •  albuterol 108 (90 BASE) MCG/ACT Aero Soln inhalation aerosol, Inhale 2 Puffs by mouth every 6 hours as needed for Shortness of Breath. (Patient not taking: Reported on 11/30/2018), Disp: 8.5 g, Rfl: 3  •  SUMAtriptan (IMITREX) 25 MG Tab tablet, Take 1-4 Tabs by mouth Once PRN for Migraine for up to 1 dose., Disp: 10 Tab, Rfl: 3  •  omeprazole (PRILOSEC) 40 MG delayed-release capsule, Take 1 Cap by mouth 1 time daily as needed. (Patient not taking: Reported on 11/30/2018), Disp: 30 Cap, Rfl: 6  •  fluticasone (FLONASE) 50 MCG/ACT nasal spray, Spray 2 Sprays in nose every day. (Patient not taking: Reported on 11/30/2018), Disp: 16 g, Rfl: 11  •  vitamin D, Ergocalciferol, (DRISDOL) 34762 UNITS Cap capsule, Take  by mouth every 7 days., Disp: , Rfl:   •  silver sulfADIAZINE (SILVADENE) 1 % Cream, Apply 0.5 g to affected area(s) every day. (Patient not taking: Reported on 11/30/2018), Disp: 30 g, Rfl: 3  •  meloxicam (MOBIC) 15 MG tablet, 1 TAB ONCE A DAY ONLY IF NEEDED FOR PAIN AND INFLAMMATION. TAKE WITH FOOD. (Patient not taking: Reported on 11/30/2018),  "Disp: 30 Tab, Rfl: 0  •  acyclovir (ZOVIRAX) 5 % Ointment, Apply 1 Application to affected area(s) every 3 hours. (Patient not taking: Reported on 11/30/2018), Disp: 1 Tube, Rfl: 3  •  aspirin EC (ECOTRIN) 81 MG TBEC, Take 81 mg by mouth every day., Disp: , Rfl:     PROBLEMS:  Patient Active Problem List    Diagnosis Date Noted   • Dyslipidemia 11/30/2018   • Closed wedge compression fracture of T10 vertebra (HCC) 08/29/2017   • Migraine with aura and without status migrainosus, not intractable 03/10/2017   • Acquired hypothyroidism 10/13/2016   • Vaginal atrophy 10/13/2016       REVIEW OF SYSTEMS:  Gen.:  No Nausea, Vomiting, fever, Chills.  Heart: No chest pain.  Lungs:  No shortness of Breath.  Psychological: Collin unusual Anxiety depression     PHYSICAL EXAM   Constitutional: Alert, cooperative, not in acute distress.  Cardiovascular:  Rate Rhythm is regular without murmurs rubs clicks.     Thorax & Lungs: Clear to auscultation, no wheezing, rhonchi, or rales  HENT: Normocephalic, Atraumatic.  Eyes: PERRLA, EOMI, Conjunctiva normal.   Neck: Trachia is midline no swelling of the thyroid.   Lymphatic: No lymphadenopathy noted.   Neurologic: Alert & oriented x 3, cranial nerves II through XII are intact, Normal motor function, Normal sensory function, No focal deficits noted.   Psychiatric: Affect normal, Judgment normal, Mood normal.     VITAL SIGNS:/80   Pulse 76   Temp 36.6 °C (97.8 °F) (Temporal)   Resp 12   Ht 1.626 m (5' 4\")   Wt 64.9 kg (143 lb)   SpO2 98%   BMI 24.55 kg/m²     Labs: Reviewed    Assessment:                                                     Plan:    1. Closed wedge compression fracture of tenth thoracic vertebra with routine healing, subsequent encounter  Continued exercise, physical therapy as indicated    2. Encounter for vitamin deficiency screening  Recheck vitamin D  - VITAMIN D,25 HYDROXY; Future    3. Acquired hypothyroidism  Recheck free T4 TSH  - TSH; Future  - FREE " THYROXINE; Future    4. Screening cholesterol level  Recheck cholesterol  - COMP METABOLIC PANEL; Future  - CBC WITH DIFFERENTIAL; Future  - Lipid Profile; Future

## 2018-12-03 ENCOUNTER — TELEPHONE (OUTPATIENT)
Dept: MEDICAL GROUP | Facility: LAB | Age: 58
End: 2018-12-03

## 2018-12-03 NOTE — TELEPHONE ENCOUNTER
"· Physical Therapy paperwork received from [facility] requiring provider signature.     · All appropriate fields completed by Medical Assistant: No    · Paperwork placed in \"MA to Provider\" folder/basket. Awaiting provider completion/signature.  "

## 2019-01-08 ENCOUNTER — OFFICE VISIT (OUTPATIENT)
Dept: ENDOCRINOLOGY | Facility: MEDICAL CENTER | Age: 59
End: 2019-01-08
Payer: COMMERCIAL

## 2019-01-08 VITALS
WEIGHT: 144 LBS | HEIGHT: 64 IN | HEART RATE: 82 BPM | RESPIRATION RATE: 16 BRPM | BODY MASS INDEX: 24.59 KG/M2 | DIASTOLIC BLOOD PRESSURE: 80 MMHG | OXYGEN SATURATION: 98 % | SYSTOLIC BLOOD PRESSURE: 122 MMHG

## 2019-01-08 DIAGNOSIS — R77.1 ELEVATED SERUM GLOBULIN LEVEL: ICD-10-CM

## 2019-01-08 DIAGNOSIS — R74.8 ELEVATED ALKALINE PHOSPHATASE IN NEWBORN: ICD-10-CM

## 2019-01-08 DIAGNOSIS — M85.80 OSTEOPENIA AFTER MENOPAUSE: Primary | ICD-10-CM

## 2019-01-08 DIAGNOSIS — Z78.0 OSTEOPENIA AFTER MENOPAUSE: Primary | ICD-10-CM

## 2019-01-08 DIAGNOSIS — S22.070D CLOSED WEDGE COMPRESSION FRACTURE OF TENTH THORACIC VERTEBRA WITH ROUTINE HEALING, SUBSEQUENT ENCOUNTER: ICD-10-CM

## 2019-01-08 PROCEDURE — 99245 OFF/OP CONSLTJ NEW/EST HI 55: CPT | Performed by: INTERNAL MEDICINE
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
## 2019-02-08 RX ORDER — LEVOTHYROXINE SODIUM 75 MCG
TABLET ORAL
Qty: 90 TAB | Refills: 3 | Status: SHIPPED | OUTPATIENT
Start: 2019-02-08 | End: 2020-02-21 | Stop reason: SDUPTHER

## 2019-02-08 NOTE — TELEPHONE ENCOUNTER
----- Message from Fabiana Flores sent at 2/8/2019  2:00 PM PST -----  Regarding: Non-Urgent Medical Question  Contact: [phone]  Liza Jarrell,     I would appreciate if you can write my Synthroid Rx. It's time  for me to get  a refill on the Synthroid 75 mcg. I I do have a 10 tablets at this time.     My insurance has changed and I would appreciate if I  the RX from your office for a 90 day supply and refills for the year.     I plan to get it filled at [facility] vs [facility] [place]. Since,  I have never filled it at [facility], I would like to physically take the RX to their pharmacy.       [facility]- [place]  [phone]     Thank you,   Fabiana Flores

## 2019-02-08 NOTE — TELEPHONE ENCOUNTER
Was the patient seen in the last year in this department? Yes  LOV-11/30/18 Labs 12/1/18  Does patient have an active prescription for medications requested? No     Received Request Via: Patient would like to hand carry/ RX paper-please

## 2019-04-11 ENCOUNTER — TELEPHONE (OUTPATIENT)
Dept: MEDICAL GROUP | Facility: LAB | Age: 59
End: 2019-04-11

## 2019-04-11 NOTE — TELEPHONE ENCOUNTER
Debbie:  Please call Fabiana, tell her not to take pseudoephedrine I am concerned about possible increased risk for stroke.  Dayquil appears to be acceptable

## 2019-04-11 NOTE — TELEPHONE ENCOUNTER
1. Caller Name: Fabiana                                          Call Back Number: [phone] (home)        Patient approves a detailed voicemail message: yes    Fabiana, x 4 days has had sore throat, cough, ear aches,  Headaches.  She is using Sudafed and Day Quil.  Please advise.

## 2019-07-15 ENCOUNTER — OFFICE VISIT (OUTPATIENT)
Dept: MEDICAL GROUP | Facility: LAB | Age: 59
End: 2019-07-15
Payer: COMMERCIAL

## 2019-07-15 VITALS
SYSTOLIC BLOOD PRESSURE: 118 MMHG | OXYGEN SATURATION: 97 % | RESPIRATION RATE: 12 BRPM | WEIGHT: 141 LBS | TEMPERATURE: 98.7 F | DIASTOLIC BLOOD PRESSURE: 78 MMHG | BODY MASS INDEX: 24.07 KG/M2 | HEIGHT: 64 IN | HEART RATE: 69 BPM

## 2019-07-15 DIAGNOSIS — G89.29 CHRONIC LEFT SHOULDER PAIN: ICD-10-CM

## 2019-07-15 DIAGNOSIS — M54.2 CERVICALGIA: ICD-10-CM

## 2019-07-15 DIAGNOSIS — M25.512 CHRONIC LEFT SHOULDER PAIN: ICD-10-CM

## 2019-07-15 PROCEDURE — 99213 OFFICE O/P EST LOW 20 MIN: CPT | Performed by: INTERNAL MEDICINE

## 2019-07-15 RX ORDER — ALPRAZOLAM 0.25 MG/1
0.25 TABLET ORAL ONCE
Qty: 5 TAB | Refills: 0 | Status: SHIPPED | OUTPATIENT
Start: 2019-07-15 | End: 2019-07-15

## 2019-07-15 ASSESSMENT — PATIENT HEALTH QUESTIONNAIRE - PHQ9: CLINICAL INTERPRETATION OF PHQ2 SCORE: 0
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
## 2019-07-17 ENCOUNTER — HOSPITAL ENCOUNTER (OUTPATIENT)
Dept: RADIOLOGY | Facility: MEDICAL CENTER | Age: 59
End: 2019-07-17
Attending: INTERNAL MEDICINE
Payer: COMMERCIAL

## 2019-07-17 DIAGNOSIS — M54.2 CERVICALGIA: ICD-10-CM

## 2019-07-17 DIAGNOSIS — M25.512 CHRONIC LEFT SHOULDER PAIN: ICD-10-CM

## 2019-07-17 DIAGNOSIS — G89.29 CHRONIC LEFT SHOULDER PAIN: ICD-10-CM

## 2019-07-17 PROCEDURE — 73030 X-RAY EXAM OF SHOULDER: CPT

## 2019-07-17 PROCEDURE — 72040 X-RAY EXAM NECK SPINE 2-3 VW: CPT

## 2019-07-19 ENCOUNTER — HOSPITAL ENCOUNTER (OUTPATIENT)
Dept: RADIOLOGY | Facility: MEDICAL CENTER | Age: 59
End: 2019-07-19
Attending: INTERNAL MEDICINE
Payer: COMMERCIAL

## 2019-07-19 DIAGNOSIS — M54.2 CERVICALGIA: ICD-10-CM

## 2019-07-19 DIAGNOSIS — M25.512 CHRONIC LEFT SHOULDER PAIN: ICD-10-CM

## 2019-07-19 DIAGNOSIS — G89.29 CHRONIC LEFT SHOULDER PAIN: ICD-10-CM

## 2019-07-19 PROCEDURE — 73221 MRI JOINT UPR EXTREM W/O DYE: CPT | Mod: LT

## 2019-07-19 PROCEDURE — 72141 MRI NECK SPINE W/O DYE: CPT

## 2019-07-22 ENCOUNTER — TELEPHONE (OUTPATIENT)
Dept: MEDICAL GROUP | Facility: LAB | Age: 59
End: 2019-07-22

## 2019-07-22 DIAGNOSIS — M25.512 CHRONIC LEFT SHOULDER PAIN: ICD-10-CM

## 2019-07-22 DIAGNOSIS — G89.29 CHRONIC LEFT SHOULDER PAIN: ICD-10-CM

## 2019-07-22 RX ORDER — TRAMADOL HYDROCHLORIDE 50 MG/1
50 TABLET ORAL EVERY 8 HOURS PRN
Qty: 45 TAB | Refills: 0 | Status: SHIPPED
Start: 2019-07-22 | End: 2019-07-26

## 2019-07-22 NOTE — TELEPHONE ENCOUNTER
1. Caller Name: Fabiana                                          Call Back Number: [phone] (home)        Patient approves a detailed voicemail message: yes    Pt is complaining of her left shoulder pain.  She is requesting MRI results and instructions on next step.  Possibly a pain med Rx?

## 2019-07-22 NOTE — TELEPHONE ENCOUNTER
Debbie:  Please call Fabiana, referrals been written orthopedic surgery, a prescription has been written for tramadol.  Informed her that it is a mild narcotic and should not be used when she is driving.  Regards, Ray Irizarry, DO

## 2019-07-26 ENCOUNTER — TELEPHONE (OUTPATIENT)
Dept: MEDICAL GROUP | Facility: LAB | Age: 59
End: 2019-07-26

## 2019-07-26 DIAGNOSIS — G89.29 CHRONIC LEFT SHOULDER PAIN: ICD-10-CM

## 2019-07-26 DIAGNOSIS — M25.512 CHRONIC LEFT SHOULDER PAIN: ICD-10-CM

## 2019-07-26 NOTE — TELEPHONE ENCOUNTER
Debbie:  Please call Fabiana I have written out a prescription for Tylenol 3 which is been sent to her pharmacy!  Regards, Ray Irizarry, DO

## 2019-07-26 NOTE — TELEPHONE ENCOUNTER
1. Caller Name: Fabiana                                          Call Back Number: [phone] (home)        Patient approves a detailed voicemail message: yes    Pt states that Tramadol made her so sick.  She was projectile vomiting.  She is asking for something else for pain, maybe something not so strong.  She has to leave town for 1 week.

## 2019-08-06 ENCOUNTER — GYNECOLOGY VISIT (OUTPATIENT)
Dept: OBGYN | Facility: MEDICAL CENTER | Age: 59
End: 2019-08-06
Payer: COMMERCIAL

## 2019-08-06 ENCOUNTER — HOSPITAL ENCOUNTER (OUTPATIENT)
Facility: MEDICAL CENTER | Age: 59
End: 2019-08-06
Attending: OBSTETRICS & GYNECOLOGY
Payer: COMMERCIAL

## 2019-08-06 VITALS — WEIGHT: 137 LBS | SYSTOLIC BLOOD PRESSURE: 116 MMHG | DIASTOLIC BLOOD PRESSURE: 64 MMHG | BODY MASS INDEX: 23.52 KG/M2

## 2019-08-06 DIAGNOSIS — Z11.51 SCREENING FOR HUMAN PAPILLOMAVIRUS (HPV): ICD-10-CM

## 2019-08-06 DIAGNOSIS — Z12.39 SCREENING FOR BREAST CANCER: ICD-10-CM

## 2019-08-06 DIAGNOSIS — Z12.4 SCREENING FOR CERVICAL CANCER: ICD-10-CM

## 2019-08-06 DIAGNOSIS — Z01.419 WELL WOMAN EXAM WITH ROUTINE GYNECOLOGICAL EXAM: ICD-10-CM

## 2019-08-06 PROCEDURE — 87624 HPV HI-RISK TYP POOLED RSLT: CPT

## 2019-08-06 PROCEDURE — 99396 PREV VISIT EST AGE 40-64: CPT | Performed by: OBSTETRICS & GYNECOLOGY

## 2019-08-06 PROCEDURE — 88175 CYTOPATH C/V AUTO FLUID REDO: CPT

## 2019-08-07 DIAGNOSIS — Z11.51 SCREENING FOR HUMAN PAPILLOMAVIRUS (HPV): ICD-10-CM

## 2019-08-07 DIAGNOSIS — Z12.4 SCREENING FOR CERVICAL CANCER: ICD-10-CM

## 2019-08-07 NOTE — PROGRESS NOTES
Subjective:      Fabiana Flores is a 59 y.o. female who presents with No chief complaint on file.        CC: annual exam    HPI: 60 yo  menopausal female presents for annual exam.  She has been menopausal for several years.  She denies hot flashes or mood swings.  She complains of vaginal dryness.  She has no history of STDs or abnormal Pap smears.  Family history is remarkable for history with breast cancer diagnosed in her 40s.    Past Medical History:   Diagnosis Date   • Anemia    • Celiac disease    • Closed wedge compression fracture of T10 vertebra (HCC) 2017   • Dyslipidemia 2018   • GERD (gastroesophageal reflux disease)    • Herpes simplex type 1 antibody positive     lips   • Hypothyroid    • Migraine    • Osteopenia        Past Surgical History:   Procedure Laterality Date   • CHOLECYSTECTOMY     • OTHER               Current Outpatient Medications:   •  estrogens, conjugated (PREMARIN) 0.625 MG/GM Cream, 1/2 gram per vagina nightly for 14 days, then every other day., Disp: 1 Tube, Rfl: 2  •  SYNTHROID 75 MCG Tab, TAKE ONE TABLET BY MOUTH EVERY MORNING ON AN EMPTY STOMACH, Disp: 90 Tab, Rfl: 3  •  azithromycin (ZITHROMAX) 250 MG Tab, Two day one then qd x 4. (Patient not taking: Reported on 2018), Disp: 6 Tab, Rfl: 0  •  Calcium Carbonate-Vit D-Min (CALCIUM 1200 PO), Take  by mouth., Disp: , Rfl:   •  MethylPREDNISolone (MEDROL DOSEPAK) 4 MG Tablet Therapy Pack, , Disp: , Rfl:   •  ondansetron (ZOFRAN ODT) 4 MG TABLET DISPERSIBLE, , Disp: , Rfl:   •  vitamin D (CHOLECALCIFEROL) 1000 UNIT Tab, Take 1,000 Units by mouth every day., Disp: , Rfl:   •  lidocaine (LIDODERM) 5 % Patch, Apply 1 Patch to skin as directed every 24 hours. as needed for nerve pain (Patient not taking: Reported on 2018), Disp: 30 Patch, Rfl: 1  •  gabapentin (NEURONTIN) 300 MG Cap, Take 1 Cap by mouth 3 times a day. as needed for nerve pain (Patient not taking: Reported on 2018), Disp: 45  Cap, Rfl: 1  •  ibuprofen (MOTRIN) 600 MG Tab, Take 600 mg by mouth every 6 hours as needed., Disp: , Rfl:   •  methocarbamol (ROBAXIN) 500 MG Tab, Take 2 Tabs by mouth 3 times a day. (Patient not taking: Reported on 11/30/2018), Disp: 60 Tab, Rfl: 1  •  ondansetron (ZOFRAN) 4 MG Tab tablet, Take 1 Tab by mouth every 6 hours as needed for Nausea/Vomiting. (Patient not taking: Reported on 11/30/2018), Disp: 30 Tab, Rfl: 0  •  albuterol 108 (90 BASE) MCG/ACT Aero Soln inhalation aerosol, Inhale 2 Puffs by mouth every 6 hours as needed for Shortness of Breath., Disp: 8.5 g, Rfl: 3  •  SUMAtriptan (IMITREX) 25 MG Tab tablet, Take 1-4 Tabs by mouth Once PRN for Migraine for up to 1 dose. (Patient not taking: Reported on 1/8/2019), Disp: 10 Tab, Rfl: 3  •  omeprazole (PRILOSEC) 40 MG delayed-release capsule, Take 1 Cap by mouth 1 time daily as needed. (Patient not taking: Reported on 11/30/2018), Disp: 30 Cap, Rfl: 6  •  fluticasone (FLONASE) 50 MCG/ACT nasal spray, Spray 2 Sprays in nose every day. (Patient not taking: Reported on 11/30/2018), Disp: 16 g, Rfl: 11  •  vitamin D, Ergocalciferol, (DRISDOL) 55566 UNITS Cap capsule, Take  by mouth every 7 days., Disp: , Rfl:   •  silver sulfADIAZINE (SILVADENE) 1 % Cream, Apply 0.5 g to affected area(s) every day. (Patient not taking: Reported on 11/30/2018), Disp: 30 g, Rfl: 3  •  meloxicam (MOBIC) 15 MG tablet, 1 TAB ONCE A DAY ONLY IF NEEDED FOR PAIN AND INFLAMMATION. TAKE WITH FOOD. (Patient not taking: Reported on 11/30/2018), Disp: 30 Tab, Rfl: 0  •  acyclovir (ZOVIRAX) 5 % Ointment, Apply 1 Application to affected area(s) every 3 hours., Disp: 1 Tube, Rfl: 3  •  Calcium Carb-Cholecalciferol 600-800 MG-UNIT Tab, Take 2 tablet by mouth every day., Disp: 60 Tab, Rfl: 11  •  aspirin EC (ECOTRIN) 81 MG TBEC, Take 81 mg by mouth every day., Disp: , Rfl:     Ultram [tramadol hcl]; Food; and Nkda [no known drug allergy]    Family History   Problem Relation Age of Onset   •  Cancer Sister 40        breast       Social History     Socioeconomic History   • Marital status:      Spouse name: Not on file   • Number of children: Not on file   • Years of education: Not on file   • Highest education level: Not on file   Occupational History   • Not on file   Social Needs   • Financial resource strain: Not on file   • Food insecurity:     Worry: Not on file     Inability: Not on file   • Transportation needs:     Medical: Not on file     Non-medical: Not on file   Tobacco Use   • Smoking status: Never Smoker   • Smokeless tobacco: Never Used   Substance and Sexual Activity   • Alcohol use: No   • Drug use: No   • Sexual activity: Yes     Partners: Male   Lifestyle   • Physical activity:     Days per week: Not on file     Minutes per session: Not on file   • Stress: Not on file   Relationships   • Social connections:     Talks on phone: Not on file     Gets together: Not on file     Attends Anabaptism service: Not on file     Active member of club or organization: Not on file     Attends meetings of clubs or organizations: Not on file     Relationship status: Not on file   • Intimate partner violence:     Fear of current or ex partner: Not on file     Emotionally abused: Not on file     Physically abused: Not on file     Forced sexual activity: Not on file   Other Topics Concern   • Not on file   Social History Narrative   • Not on file       OB History    Para Term  AB Living   2 2 0 0 0 2   SAB TAB Ectopic Molar Multiple Live Births   0 0 0 0 0 0       ROS REVIEW OF SYSTEMS:    Pertinent positives and negatives mentioned in HPI.    All other systems reviewed and are negative or noncontributory.       Objective:     /64   Wt 62.1 kg (137 lb)   LMP 09/10/2007   BMI 23.52 kg/m²      Physical Exam  GENERAL: Alert, in no apparent distress  PSYCHIATRIC: Appropriate affect, intact insight and judgement.  NECK:  Nontender, no masses.  No Thyromegaly or nodules. No  lymphadenopathy.  RESPIRATORY: Normal respiratory effort.  Lungs clear to auscultation.   CARDIOVASCULAR: RRR, no murmur, gallop, or rub.  ABDOMEN: Soft, nontender, nondistended.  No palpable masses.  No rebound or guarding.  No inguinal lymphadenopathy.  No hepatosplenomegaly.  No hernias.  BACK: No CVA tenderness  EXTREMITIES: No edema  SKIN: No rash    BREAST: No masses or tenderness.  No skin changes.  No nipple inversion or discharge. No axillary lymphadenopathy.      GENITOURINARY:  Normal external genitalia, no lesions.  Normal urethral meatus, no masses or tenderness.  Normal bladder without fullness or masses.  Vagina erythematous, loss of rugae, atrophic, no vaginal discharge or lesions.  Cervix without lesions or discharge, nontender.  Uterus normal size, shape, and contour, nontender.  Adnexa nontender, no masses.  Normal anus and perineum.    Rectal Exam - not indicated.          Assessment/Plan:     1. Well woman exam with routine gynecological exam  Reviewed monthly self breast exams and need for yearly mammograms starting at age 40.  Discussed calcium intake, Vitamin D,  and weight bearing exercise for bone health.     2. Screening for cervical cancer  - THINPREP PAP WITH HPV; Future    3. Screening for human papillomavirus (HPV)  - THINPREP PAP WITH HPV; Future    4. Screening for breast cancer  - MA-SCREENING MAMMO BILAT W/TOMOSYNTHESIS W/CAD; Future    5. Vaginal atrophy - premarin vaginal cream 0.5 gram per vagina nightly for two weeks, then every other day.  Return if new onset of vaginal bleeding.     F/U prn

## 2019-08-08 ENCOUNTER — OFFICE VISIT (OUTPATIENT)
Dept: MEDICAL GROUP | Facility: LAB | Age: 59
End: 2019-08-08
Payer: COMMERCIAL

## 2019-08-08 ENCOUNTER — PATIENT MESSAGE (OUTPATIENT)
Dept: MEDICAL GROUP | Facility: LAB | Age: 59
End: 2019-08-08

## 2019-08-08 VITALS
HEART RATE: 88 BPM | HEIGHT: 64 IN | RESPIRATION RATE: 12 BRPM | WEIGHT: 137 LBS | OXYGEN SATURATION: 99 % | DIASTOLIC BLOOD PRESSURE: 64 MMHG | SYSTOLIC BLOOD PRESSURE: 108 MMHG | TEMPERATURE: 97.9 F | BODY MASS INDEX: 23.39 KG/M2

## 2019-08-08 DIAGNOSIS — H44.002 INFECTION OF LEFT EYE: ICD-10-CM

## 2019-08-08 PROCEDURE — 99213 OFFICE O/P EST LOW 20 MIN: CPT | Performed by: INTERNAL MEDICINE

## 2019-08-08 RX ORDER — TOBRAMYCIN 3 MG/ML
1 SOLUTION/ DROPS OPHTHALMIC EVERY 4 HOURS
Qty: 1 BOTTLE | Refills: 0 | Status: SHIPPED | OUTPATIENT
Start: 2019-08-08 | End: 2021-06-15

## 2019-08-08 NOTE — TELEPHONE ENCOUNTER
From: Fabiana Flores  To: Ray Irizarry D.O.  Sent: 8/8/2019 7:54 AM PDT  Subject: Prescription Question    Good morning,    My left eye is swollen shut.. stye problem that started about 5 days ago.    I have tried hot compresses and need some eye ointment.    Thank you.

## 2019-08-09 ENCOUNTER — TELEPHONE (OUTPATIENT)
Dept: MEDICAL GROUP | Facility: LAB | Age: 59
End: 2019-08-09

## 2019-08-09 LAB
CYTOLOGY REG CYTOL: NORMAL
HPV HR 12 DNA CVX QL NAA+PROBE: NEGATIVE
HPV16 DNA SPEC QL NAA+PROBE: NEGATIVE
HPV18 DNA SPEC QL NAA+PROBE: NEGATIVE
SPECIMEN SOURCE: NORMAL

## 2019-08-09 NOTE — PROGRESS NOTES
CC: Fabiana Flores is a 59 y.o. female is suffering from   Chief Complaint   Patient presents with   • Eye Problem     left eye problem, sty         SUBJECTIVE:  1. Infection of left eye  Fabiana is here for follow-up has what appears to be a possible stye versus chalazion associated with her left eye.  Has been using warm compresses but states her symptoms are worsening        Past social, family, history:   Social History     Tobacco Use   • Smoking status: Never Smoker   • Smokeless tobacco: Never Used   Substance Use Topics   • Alcohol use: No   • Drug use: No         MEDICATIONS:    Current Outpatient Medications:   •  tobramycin (TOBREX) 0.3 % Solution ophthalmic solution, Place 1 Drop in both eyes every 4 hours., Disp: 1 Bottle, Rfl: 0  •  estrogens, conjugated (PREMARIN) 0.625 MG/GM Cream, 1/2 gram per vagina nightly for 14 days, then every other day., Disp: 1 Tube, Rfl: 2  •  SYNTHROID 75 MCG Tab, TAKE ONE TABLET BY MOUTH EVERY MORNING ON AN EMPTY STOMACH, Disp: 90 Tab, Rfl: 3  •  Calcium Carbonate-Vit D-Min (CALCIUM 1200 PO), Take  by mouth., Disp: , Rfl:   •  acyclovir (ZOVIRAX) 5 % Ointment, Apply 1 Application to affected area(s) every 3 hours., Disp: 1 Tube, Rfl: 3  •  Calcium Carb-Cholecalciferol 600-800 MG-UNIT Tab, Take 2 tablet by mouth every day., Disp: 60 Tab, Rfl: 11  •  azithromycin (ZITHROMAX) 250 MG Tab, Two day one then qd x 4. (Patient not taking: Reported on 11/30/2018), Disp: 6 Tab, Rfl: 0  •  MethylPREDNISolone (MEDROL DOSEPAK) 4 MG Tablet Therapy Pack, , Disp: , Rfl:   •  ondansetron (ZOFRAN ODT) 4 MG TABLET DISPERSIBLE, , Disp: , Rfl:   •  vitamin D (CHOLECALCIFEROL) 1000 UNIT Tab, Take 1,000 Units by mouth every day., Disp: , Rfl:   •  lidocaine (LIDODERM) 5 % Patch, Apply 1 Patch to skin as directed every 24 hours. as needed for nerve pain (Patient not taking: Reported on 11/30/2018), Disp: 30 Patch, Rfl: 1  •  gabapentin (NEURONTIN) 300 MG Cap, Take 1 Cap by mouth 3 times a  day. as needed for nerve pain (Patient not taking: Reported on 11/30/2018), Disp: 45 Cap, Rfl: 1  •  ibuprofen (MOTRIN) 600 MG Tab, Take 600 mg by mouth every 6 hours as needed., Disp: , Rfl:   •  methocarbamol (ROBAXIN) 500 MG Tab, Take 2 Tabs by mouth 3 times a day. (Patient not taking: Reported on 11/30/2018), Disp: 60 Tab, Rfl: 1  •  ondansetron (ZOFRAN) 4 MG Tab tablet, Take 1 Tab by mouth every 6 hours as needed for Nausea/Vomiting. (Patient not taking: Reported on 11/30/2018), Disp: 30 Tab, Rfl: 0  •  albuterol 108 (90 BASE) MCG/ACT Aero Soln inhalation aerosol, Inhale 2 Puffs by mouth every 6 hours as needed for Shortness of Breath., Disp: 8.5 g, Rfl: 3  •  SUMAtriptan (IMITREX) 25 MG Tab tablet, Take 1-4 Tabs by mouth Once PRN for Migraine for up to 1 dose. (Patient not taking: Reported on 1/8/2019), Disp: 10 Tab, Rfl: 3  •  omeprazole (PRILOSEC) 40 MG delayed-release capsule, Take 1 Cap by mouth 1 time daily as needed. (Patient not taking: Reported on 11/30/2018), Disp: 30 Cap, Rfl: 6  •  fluticasone (FLONASE) 50 MCG/ACT nasal spray, Spray 2 Sprays in nose every day. (Patient not taking: Reported on 11/30/2018), Disp: 16 g, Rfl: 11  •  vitamin D, Ergocalciferol, (DRISDOL) 26849 UNITS Cap capsule, Take  by mouth every 7 days., Disp: , Rfl:   •  silver sulfADIAZINE (SILVADENE) 1 % Cream, Apply 0.5 g to affected area(s) every day. (Patient not taking: Reported on 11/30/2018), Disp: 30 g, Rfl: 3  •  meloxicam (MOBIC) 15 MG tablet, 1 TAB ONCE A DAY ONLY IF NEEDED FOR PAIN AND INFLAMMATION. TAKE WITH FOOD. (Patient not taking: Reported on 11/30/2018), Disp: 30 Tab, Rfl: 0  •  aspirin EC (ECOTRIN) 81 MG TBEC, Take 81 mg by mouth every day., Disp: , Rfl:     PROBLEMS:  Patient Active Problem List    Diagnosis Date Noted   • Dyslipidemia 11/30/2018   • Closed wedge compression fracture of T10 vertebra (HCC) 08/29/2017   • Migraine with aura and without status migrainosus, not intractable 03/10/2017   • Acquired  "hypothyroidism 10/13/2016   • Vaginal atrophy 10/13/2016       REVIEW OF SYSTEMS:  Gen.:  No Nausea, Vomiting, fever, Chills.  Heart: No chest pain.  Lungs:  No shortness of Breath.  Psychological: Collin unusual Anxiety depression     PHYSICAL EXAM   Constitutional: Alert, cooperative, not in acute distress.  Cardiovascular:  Rate Rhythm is regular without murmurs rubs clicks.     Thorax & Lungs: Clear to auscultation, no wheezing, rhonchi, or rales  HENT: Normocephalic, Atraumatic.  Eyes: PERRLA, EOMI, left upper eyelid is swollen consistent with probable chalazion.   Neck: Trachia is midline no swelling of the thyroid.   Lymphatic: No lymphadenopathy noted.   Neurologic: Alert & oriented x 3, cranial nerves II through XII are intact, Normal motor function, Normal sensory function, No focal deficits noted.   Psychiatric: Affect normal, Judgment normal, Mood normal.     VITAL SIGNS:/64   Pulse 88   Temp 36.6 °C (97.9 °F) (Temporal)   Resp 12   Ht 1.626 m (5' 4\")   Wt 62.1 kg (137 lb)   LMP 09/10/2007   SpO2 99%   BMI 23.52 kg/m²     Labs: Reviewed    Assessment:                                                     Plan:    1. Infection of left eye  Discussed with the patient talking to her optometrist, I have written out for Tobrex ophthalmic  - tobramycin (TOBREX) 0.3 % Solution ophthalmic solution; Place 1 Drop in both eyes every 4 hours.  Dispense: 1 Bottle; Refill: 0        "

## 2019-08-09 NOTE — TELEPHONE ENCOUNTER
Telephone call returned, advised on patient's cell phone answering machine that she be seen in emergency room setting.

## 2019-08-09 NOTE — TELEPHONE ENCOUNTER
1. Caller Name: Fabiana                                          Call Back Number: [phone] (home)        Patient approves a detailed voicemail message: yes    Fabiana's left eye is swollen on the bottom now.  There is pus also.  Please advise.

## 2019-08-26 ENCOUNTER — OFFICE VISIT (OUTPATIENT)
Dept: MEDICAL GROUP | Facility: LAB | Age: 59
End: 2019-08-26
Payer: COMMERCIAL

## 2019-08-26 VITALS
RESPIRATION RATE: 12 BRPM | WEIGHT: 140 LBS | SYSTOLIC BLOOD PRESSURE: 118 MMHG | HEIGHT: 64 IN | TEMPERATURE: 97.4 F | DIASTOLIC BLOOD PRESSURE: 74 MMHG | BODY MASS INDEX: 23.9 KG/M2 | HEART RATE: 73 BPM | OXYGEN SATURATION: 96 %

## 2019-08-26 DIAGNOSIS — M54.2 NECK PAIN: ICD-10-CM

## 2019-08-26 DIAGNOSIS — M48.02 CERVICAL STENOSIS OF SPINAL CANAL: ICD-10-CM

## 2019-08-26 DIAGNOSIS — R29.898 WEAKNESS OF LEFT ARM: ICD-10-CM

## 2019-08-26 PROCEDURE — 99214 OFFICE O/P EST MOD 30 MIN: CPT | Performed by: INTERNAL MEDICINE

## 2019-08-27 NOTE — PROGRESS NOTES
CC: Fabiana Flores is a 59 y.o. female is suffering from   Chief Complaint   Patient presents with   • Neck Pain   • Shoulder Pain         SUBJECTIVE:  1. Neck pain  Fabiana has undergone evaluation by neurosurgery, consultation reviewed briefly.  Patient does have central canal stenosis with neuroforaminal stenosis possibly leading to his left arm.    2. Cervical stenosis of spinal canal  Patient with cervical stenosis, MRI was reviewed in the office.    3. Weakness of left arm  Ongoing weakness with left arm possibly frozen shoulder.  Patient is undergoing physical therapy to improve range of motion.        Past social, family, history: , Bobby.   Social History     Tobacco Use   • Smoking status: Never Smoker   • Smokeless tobacco: Never Used   Substance Use Topics   • Alcohol use: No   • Drug use: No         MEDICATIONS:    Current Outpatient Medications:   •  tobramycin (TOBREX) 0.3 % Solution ophthalmic solution, Place 1 Drop in both eyes every 4 hours., Disp: 1 Bottle, Rfl: 0  •  estrogens, conjugated (PREMARIN) 0.625 MG/GM Cream, 1/2 gram per vagina nightly for 14 days, then every other day., Disp: 1 Tube, Rfl: 2  •  SYNTHROID 75 MCG Tab, TAKE ONE TABLET BY MOUTH EVERY MORNING ON AN EMPTY STOMACH, Disp: 90 Tab, Rfl: 3  •  Calcium Carbonate-Vit D-Min (CALCIUM 1200 PO), Take  by mouth., Disp: , Rfl:   •  vitamin D (CHOLECALCIFEROL) 1000 UNIT Tab, Take 1,000 Units by mouth every day., Disp: , Rfl:   •  methocarbamol (ROBAXIN) 500 MG Tab, Take 2 Tabs by mouth 3 times a day., Disp: 60 Tab, Rfl: 1  •  meloxicam (MOBIC) 15 MG tablet, 1 TAB ONCE A DAY ONLY IF NEEDED FOR PAIN AND INFLAMMATION. TAKE WITH FOOD., Disp: 30 Tab, Rfl: 0  •  acyclovir (ZOVIRAX) 5 % Ointment, Apply 1 Application to affected area(s) every 3 hours., Disp: 1 Tube, Rfl: 3  •  Calcium Carb-Cholecalciferol 600-800 MG-UNIT Tab, Take 2 tablet by mouth every day., Disp: 60 Tab, Rfl: 11  •  azithromycin (ZITHROMAX) 250 MG Tab, Two day one then qd  x 4. (Patient not taking: Reported on 11/30/2018), Disp: 6 Tab, Rfl: 0  •  MethylPREDNISolone (MEDROL DOSEPAK) 4 MG Tablet Therapy Pack, , Disp: , Rfl:   •  ondansetron (ZOFRAN ODT) 4 MG TABLET DISPERSIBLE, , Disp: , Rfl:   •  lidocaine (LIDODERM) 5 % Patch, Apply 1 Patch to skin as directed every 24 hours. as needed for nerve pain (Patient not taking: Reported on 11/30/2018), Disp: 30 Patch, Rfl: 1  •  gabapentin (NEURONTIN) 300 MG Cap, Take 1 Cap by mouth 3 times a day. as needed for nerve pain (Patient not taking: Reported on 11/30/2018), Disp: 45 Cap, Rfl: 1  •  ibuprofen (MOTRIN) 600 MG Tab, Take 600 mg by mouth every 6 hours as needed., Disp: , Rfl:   •  ondansetron (ZOFRAN) 4 MG Tab tablet, Take 1 Tab by mouth every 6 hours as needed for Nausea/Vomiting. (Patient not taking: Reported on 11/30/2018), Disp: 30 Tab, Rfl: 0  •  albuterol 108 (90 BASE) MCG/ACT Aero Soln inhalation aerosol, Inhale 2 Puffs by mouth every 6 hours as needed for Shortness of Breath., Disp: 8.5 g, Rfl: 3  •  SUMAtriptan (IMITREX) 25 MG Tab tablet, Take 1-4 Tabs by mouth Once PRN for Migraine for up to 1 dose. (Patient not taking: Reported on 1/8/2019), Disp: 10 Tab, Rfl: 3  •  omeprazole (PRILOSEC) 40 MG delayed-release capsule, Take 1 Cap by mouth 1 time daily as needed. (Patient not taking: Reported on 11/30/2018), Disp: 30 Cap, Rfl: 6  •  fluticasone (FLONASE) 50 MCG/ACT nasal spray, Spray 2 Sprays in nose every day. (Patient not taking: Reported on 11/30/2018), Disp: 16 g, Rfl: 11  •  vitamin D, Ergocalciferol, (DRISDOL) 33190 UNITS Cap capsule, Take  by mouth every 7 days., Disp: , Rfl:   •  silver sulfADIAZINE (SILVADENE) 1 % Cream, Apply 0.5 g to affected area(s) every day. (Patient not taking: Reported on 11/30/2018), Disp: 30 g, Rfl: 3  •  aspirin EC (ECOTRIN) 81 MG TBEC, Take 81 mg by mouth every day., Disp: , Rfl:     PROBLEMS:  Patient Active Problem List    Diagnosis Date Noted   • Dyslipidemia 11/30/2018   • Closed wedge  "compression fracture of T10 vertebra (HCC) 08/29/2017   • Migraine with aura and without status migrainosus, not intractable 03/10/2017   • Acquired hypothyroidism 10/13/2016   • Vaginal atrophy 10/13/2016       REVIEW OF SYSTEMS:  Gen.:  No Nausea, Vomiting, fever, Chills.  Heart: No chest pain.  Lungs:  No shortness of Breath.  Psychological: Collin unusual Anxiety depression     PHYSICAL EXAM   Constitutional: Alert, cooperative, not in acute distress.  Cardiovascular:  Rate Rhythm is regular without murmurs rubs clicks.     Thorax & Lungs: Clear to auscultation, no wheezing, rhonchi, or rales  HENT: Normocephalic, Atraumatic.  Eyes: PERRLA, EOMI, Conjunctiva normal.   Neck: Trachia is midline no swelling of the thyroid.   Lymphatic: No lymphadenopathy noted.   Neurologic: Alert & oriented x 3, cranial nerves II through XII are intact, Normal motor function, Normal sensory function, No focal deficits noted.   Psychiatric: Affect normal, Judgment normal, Mood normal.     VITAL SIGNS:/74   Pulse 73   Temp 36.3 °C (97.4 °F) (Temporal)   Resp 12   Ht 1.626 m (5' 4\")   Wt 63.5 kg (140 lb)   LMP 09/10/2007   SpO2 96%   BMI 24.03 kg/m²     Labs: Reviewed    Assessment:                                                     Plan:    1. Neck pain  Neck pain, continue with Mobic    2. Cervical stenosis of spinal canal  MRI reviewed in the office.  Neurosurgery consultation reviewed    3. Weakness of left arm  Patient is to continue with physical therapy to improve muscle strength and range of motion of the left shoulder        "

## 2019-09-25 ENCOUNTER — HOSPITAL ENCOUNTER (OUTPATIENT)
Dept: RADIOLOGY | Facility: MEDICAL CENTER | Age: 59
End: 2019-09-25
Attending: NEUROLOGICAL SURGERY
Payer: COMMERCIAL

## 2019-09-25 DIAGNOSIS — M48.02 SPINAL STENOSIS IN CERVICAL REGION: ICD-10-CM

## 2019-09-25 PROCEDURE — 72040 X-RAY EXAM NECK SPINE 2-3 VW: CPT

## 2019-11-23 DIAGNOSIS — S22.070D CLOSED WEDGE COMPRESSION FRACTURE OF TENTH THORACIC VERTEBRA WITH ROUTINE HEALING, SUBSEQUENT ENCOUNTER: ICD-10-CM

## 2019-11-23 DIAGNOSIS — M81.8 OTHER OSTEOPOROSIS WITHOUT CURRENT PATHOLOGICAL FRACTURE: ICD-10-CM

## 2019-12-03 ENCOUNTER — OFFICE VISIT (OUTPATIENT)
Dept: MEDICAL GROUP | Facility: LAB | Age: 59
End: 2019-12-03
Payer: COMMERCIAL

## 2019-12-03 VITALS
HEART RATE: 72 BPM | RESPIRATION RATE: 12 BRPM | HEIGHT: 65 IN | TEMPERATURE: 97.7 F | WEIGHT: 139 LBS | DIASTOLIC BLOOD PRESSURE: 62 MMHG | BODY MASS INDEX: 23.16 KG/M2 | SYSTOLIC BLOOD PRESSURE: 108 MMHG | OXYGEN SATURATION: 98 %

## 2019-12-03 DIAGNOSIS — M72.2 PLANTAR FASCIITIS: ICD-10-CM

## 2019-12-03 DIAGNOSIS — Z91.89 OTHER SPECIFIED PERSONAL RISK FACTORS, NOT ELSEWHERE CLASSIFIED: ICD-10-CM

## 2019-12-03 DIAGNOSIS — Z23 NEED FOR TDAP VACCINATION: ICD-10-CM

## 2019-12-03 DIAGNOSIS — R07.9 CHEST PAIN: ICD-10-CM

## 2019-12-03 PROCEDURE — 99214 OFFICE O/P EST MOD 30 MIN: CPT | Mod: 25 | Performed by: INTERNAL MEDICINE

## 2019-12-03 PROCEDURE — 90715 TDAP VACCINE 7 YRS/> IM: CPT | Performed by: INTERNAL MEDICINE

## 2019-12-03 PROCEDURE — 90471 IMMUNIZATION ADMIN: CPT | Performed by: INTERNAL MEDICINE

## 2019-12-03 RX ORDER — MELOXICAM 15 MG/1
TABLET ORAL
Qty: 30 TAB | Refills: 0 | Status: SHIPPED | OUTPATIENT
Start: 2019-12-03 | End: 2021-06-15

## 2019-12-04 NOTE — PROGRESS NOTES
CC: Fabiana Flores is a 59 y.o. female is suffering from   Chief Complaint   Patient presents with   • Other     3 months follow up         SUBJECTIVE:  1. Chest pain  Fabiana is here for follow-up has a history of chest pain discomfort likely musculoskeletal.  Is otherwise doing well    2. Other specified personal risk factors, not elsewhere classified  Patient is in need of screening for plaque buildup referral written    3. Plantar fasciitis  Plantar fasciitis right foot referral written a podiatry    4. Need for Tdap vaccination  Vaccination given        Past social, family, history:   Social History     Tobacco Use   • Smoking status: Never Smoker   • Smokeless tobacco: Never Used   Substance Use Topics   • Alcohol use: No   • Drug use: No         MEDICATIONS:    Current Outpatient Medications:   •  meloxicam (MOBIC) 15 MG tablet, 1 TAB ONCE A DAY ONLY IF NEEDED FOR PAIN AND INFLAMMATION. TAKE WITH FOOD., Disp: 30 Tab, Rfl: 0  •  tobramycin (TOBREX) 0.3 % Solution ophthalmic solution, Place 1 Drop in both eyes every 4 hours., Disp: 1 Bottle, Rfl: 0  •  estrogens, conjugated (PREMARIN) 0.625 MG/GM Cream, 1/2 gram per vagina nightly for 14 days, then every other day., Disp: 1 Tube, Rfl: 2  •  SYNTHROID 75 MCG Tab, TAKE ONE TABLET BY MOUTH EVERY MORNING ON AN EMPTY STOMACH, Disp: 90 Tab, Rfl: 3  •  Calcium Carbonate-Vit D-Min (CALCIUM 1200 PO), Take  by mouth., Disp: , Rfl:   •  vitamin D (CHOLECALCIFEROL) 1000 UNIT Tab, Take 1,000 Units by mouth every day., Disp: , Rfl:   •  acyclovir (ZOVIRAX) 5 % Ointment, Apply 1 Application to affected area(s) every 3 hours., Disp: 1 Tube, Rfl: 3  •  Calcium Carb-Cholecalciferol 600-800 MG-UNIT Tab, Take 2 tablet by mouth every day., Disp: 60 Tab, Rfl: 11  •  azithromycin (ZITHROMAX) 250 MG Tab, Two day one then qd x 4. (Patient not taking: Reported on 11/30/2018), Disp: 6 Tab, Rfl: 0  •  MethylPREDNISolone (MEDROL DOSEPAK) 4 MG Tablet Therapy Pack, , Disp: , Rfl:   •   ondansetron (ZOFRAN ODT) 4 MG TABLET DISPERSIBLE, , Disp: , Rfl:   •  lidocaine (LIDODERM) 5 % Patch, Apply 1 Patch to skin as directed every 24 hours. as needed for nerve pain (Patient not taking: Reported on 11/30/2018), Disp: 30 Patch, Rfl: 1  •  gabapentin (NEURONTIN) 300 MG Cap, Take 1 Cap by mouth 3 times a day. as needed for nerve pain (Patient not taking: Reported on 11/30/2018), Disp: 45 Cap, Rfl: 1  •  ibuprofen (MOTRIN) 600 MG Tab, Take 600 mg by mouth every 6 hours as needed., Disp: , Rfl:   •  methocarbamol (ROBAXIN) 500 MG Tab, Take 2 Tabs by mouth 3 times a day., Disp: 60 Tab, Rfl: 1  •  ondansetron (ZOFRAN) 4 MG Tab tablet, Take 1 Tab by mouth every 6 hours as needed for Nausea/Vomiting. (Patient not taking: Reported on 11/30/2018), Disp: 30 Tab, Rfl: 0  •  albuterol 108 (90 BASE) MCG/ACT Aero Soln inhalation aerosol, Inhale 2 Puffs by mouth every 6 hours as needed for Shortness of Breath., Disp: 8.5 g, Rfl: 3  •  SUMAtriptan (IMITREX) 25 MG Tab tablet, Take 1-4 Tabs by mouth Once PRN for Migraine for up to 1 dose. (Patient not taking: Reported on 1/8/2019), Disp: 10 Tab, Rfl: 3  •  omeprazole (PRILOSEC) 40 MG delayed-release capsule, Take 1 Cap by mouth 1 time daily as needed. (Patient not taking: Reported on 11/30/2018), Disp: 30 Cap, Rfl: 6  •  fluticasone (FLONASE) 50 MCG/ACT nasal spray, Spray 2 Sprays in nose every day. (Patient not taking: Reported on 11/30/2018), Disp: 16 g, Rfl: 11  •  vitamin D, Ergocalciferol, (DRISDOL) 03480 UNITS Cap capsule, Take  by mouth every 7 days., Disp: , Rfl:   •  silver sulfADIAZINE (SILVADENE) 1 % Cream, Apply 0.5 g to affected area(s) every day. (Patient not taking: Reported on 11/30/2018), Disp: 30 g, Rfl: 3  •  aspirin EC (ECOTRIN) 81 MG TBEC, Take 81 mg by mouth every day., Disp: , Rfl:     PROBLEMS:  Patient Active Problem List    Diagnosis Date Noted   • Other osteoporosis without current pathological fracture 11/23/2019   • Dyslipidemia 11/30/2018   •  "Closed wedge compression fracture of T10 vertebra (HCC) 08/29/2017   • Migraine with aura and without status migrainosus, not intractable 03/10/2017   • Acquired hypothyroidism 10/13/2016   • Vaginal atrophy 10/13/2016       REVIEW OF SYSTEMS:  Gen.:  No Nausea, Vomiting, fever, Chills.  Heart: No chest pain.  Lungs:  No shortness of Breath.  Psychological: Collin unusual Anxiety depression     PHYSICAL EXAM   Constitutional: Alert, cooperative, not in acute distress.  Cardiovascular:  Rate Rhythm is regular without murmurs rubs clicks.     Thorax & Lungs: Clear to auscultation, no wheezing, rhonchi, or rales  HENT: Normocephalic, Atraumatic.  Eyes: PERRLA, EOMI, Conjunctiva normal.   Neck: Trachia is midline no swelling of the thyroid.   Lymphatic: No lymphadenopathy noted.   Neurologic: Alert & oriented x 3, cranial nerves II through XII are intact, Normal motor function, Normal sensory function, No focal deficits noted.   Psychiatric: Affect normal, Judgment normal, Mood normal.     VITAL SIGNS:/62   Pulse 72   Temp 36.5 °C (97.7 °F) (Temporal)   Resp 12   Ht 1.638 m (5' 4.5\")   Wt 63 kg (139 lb)   LMP 09/10/2007   SpO2 98%   BMI 23.49 kg/m²     Labs: Reviewed    Assessment:                                                     Plan:    1. Chest pain  Start Mobic  - meloxicam (MOBIC) 15 MG tablet; 1 TAB ONCE A DAY ONLY IF NEEDED FOR PAIN AND INFLAMMATION. TAKE WITH FOOD.  Dispense: 30 Tab; Refill: 0    2. Other specified personal risk factors, not elsewhere classified  CT cardiac scoring ordered  - CT-CARDIAC SCORING; Future    3. Plantar fasciitis  Referral written a podiatry  - REFERRAL TO PODIATRY    4. Need for Tdap vaccination  Tdap vaccination given  - TDAP VACCINE =>6YO IM        "

## 2019-12-05 ENCOUNTER — HOSPITAL ENCOUNTER (OUTPATIENT)
Dept: RADIOLOGY | Facility: MEDICAL CENTER | Age: 59
End: 2019-12-05
Attending: OBSTETRICS & GYNECOLOGY
Payer: COMMERCIAL

## 2019-12-05 ENCOUNTER — PATIENT MESSAGE (OUTPATIENT)
Dept: MEDICAL GROUP | Facility: LAB | Age: 59
End: 2019-12-05

## 2019-12-05 DIAGNOSIS — Z12.39 SCREENING FOR BREAST CANCER: ICD-10-CM

## 2019-12-05 PROCEDURE — 77063 BREAST TOMOSYNTHESIS BI: CPT

## 2019-12-06 ENCOUNTER — HOSPITAL ENCOUNTER (OUTPATIENT)
Dept: RADIOLOGY | Facility: MEDICAL CENTER | Age: 59
End: 2019-12-06
Attending: INTERNAL MEDICINE
Payer: COMMERCIAL

## 2019-12-06 DIAGNOSIS — R92.30 BREAST DENSITY: ICD-10-CM

## 2019-12-06 PROCEDURE — 76641 ULTRASOUND BREAST COMPLETE: CPT

## 2019-12-07 ENCOUNTER — HOSPITAL ENCOUNTER (OUTPATIENT)
Dept: LAB | Facility: MEDICAL CENTER | Age: 59
End: 2019-12-07
Attending: INTERNAL MEDICINE
Payer: COMMERCIAL

## 2019-12-07 DIAGNOSIS — E78.5 DYSLIPIDEMIA: ICD-10-CM

## 2019-12-07 DIAGNOSIS — E55.9 VITAMIN D DEFICIENCY: ICD-10-CM

## 2019-12-07 DIAGNOSIS — S22.070D CLOSED WEDGE COMPRESSION FRACTURE OF TENTH THORACIC VERTEBRA WITH ROUTINE HEALING, SUBSEQUENT ENCOUNTER: ICD-10-CM

## 2019-12-07 DIAGNOSIS — E03.9 ACQUIRED HYPOTHYROIDISM: ICD-10-CM

## 2019-12-07 DIAGNOSIS — M81.8 OTHER OSTEOPOROSIS WITHOUT CURRENT PATHOLOGICAL FRACTURE: ICD-10-CM

## 2019-12-07 LAB
25(OH)D3 SERPL-MCNC: 35 NG/ML (ref 30–100)
ALBUMIN SERPL BCP-MCNC: 3.8 G/DL (ref 3.2–4.9)
ALBUMIN/GLOB SERPL: 1.1 G/DL
ALP SERPL-CCNC: 97 U/L (ref 30–99)
ALT SERPL-CCNC: 28 U/L (ref 2–50)
ANION GAP SERPL CALC-SCNC: 7 MMOL/L (ref 0–11.9)
AST SERPL-CCNC: 28 U/L (ref 12–45)
BASOPHILS # BLD AUTO: 1.4 % (ref 0–1.8)
BASOPHILS # BLD: 0.08 K/UL (ref 0–0.12)
BILIRUB SERPL-MCNC: 0.7 MG/DL (ref 0.1–1.5)
BUN SERPL-MCNC: 14 MG/DL (ref 8–22)
CALCIUM SERPL-MCNC: 9.3 MG/DL (ref 8.5–10.5)
CHLORIDE SERPL-SCNC: 104 MMOL/L (ref 96–112)
CHOLEST SERPL-MCNC: 173 MG/DL (ref 100–199)
CO2 SERPL-SCNC: 26 MMOL/L (ref 20–33)
CREAT SERPL-MCNC: 0.65 MG/DL (ref 0.5–1.4)
EOSINOPHIL # BLD AUTO: 0.39 K/UL (ref 0–0.51)
EOSINOPHIL NFR BLD: 6.6 % (ref 0–6.9)
ERYTHROCYTE [DISTWIDTH] IN BLOOD BY AUTOMATED COUNT: 42.5 FL (ref 35.9–50)
FASTING STATUS PATIENT QL REPORTED: NORMAL
GLOBULIN SER CALC-MCNC: 3.4 G/DL (ref 1.9–3.5)
GLUCOSE SERPL-MCNC: 85 MG/DL (ref 65–99)
HCT VFR BLD AUTO: 41.2 % (ref 37–47)
HDLC SERPL-MCNC: 64 MG/DL
HGB BLD-MCNC: 13.7 G/DL (ref 12–16)
IMM GRANULOCYTES # BLD AUTO: 0.01 K/UL (ref 0–0.11)
IMM GRANULOCYTES NFR BLD AUTO: 0.2 % (ref 0–0.9)
LDLC SERPL CALC-MCNC: 87 MG/DL
LYMPHOCYTES # BLD AUTO: 2.14 K/UL (ref 1–4.8)
LYMPHOCYTES NFR BLD: 36.4 % (ref 22–41)
MCH RBC QN AUTO: 28.1 PG (ref 27–33)
MCHC RBC AUTO-ENTMCNC: 33.3 G/DL (ref 33.6–35)
MCV RBC AUTO: 84.6 FL (ref 81.4–97.8)
MONOCYTES # BLD AUTO: 0.47 K/UL (ref 0–0.85)
MONOCYTES NFR BLD AUTO: 8 % (ref 0–13.4)
NEUTROPHILS # BLD AUTO: 2.79 K/UL (ref 2–7.15)
NEUTROPHILS NFR BLD: 47.4 % (ref 44–72)
NRBC # BLD AUTO: 0 K/UL
NRBC BLD-RTO: 0 /100 WBC
PLATELET # BLD AUTO: 263 K/UL (ref 164–446)
PMV BLD AUTO: 12.1 FL (ref 9–12.9)
POTASSIUM SERPL-SCNC: 3.9 MMOL/L (ref 3.6–5.5)
PROT SERPL-MCNC: 7.2 G/DL (ref 6–8.2)
PTH-INTACT SERPL-MCNC: 29.6 PG/ML (ref 14–72)
RBC # BLD AUTO: 4.87 M/UL (ref 4.2–5.4)
SODIUM SERPL-SCNC: 137 MMOL/L (ref 135–145)
T4 FREE SERPL-MCNC: 1.07 NG/DL (ref 0.53–1.43)
TRIGL SERPL-MCNC: 111 MG/DL (ref 0–149)
TSH SERPL DL<=0.005 MIU/L-ACNC: 1.72 UIU/ML (ref 0.38–5.33)
WBC # BLD AUTO: 5.9 K/UL (ref 4.8–10.8)

## 2019-12-07 PROCEDURE — 83970 ASSAY OF PARATHORMONE: CPT

## 2019-12-07 PROCEDURE — 84165 PROTEIN E-PHORESIS SERUM: CPT

## 2019-12-07 PROCEDURE — 84443 ASSAY THYROID STIM HORMONE: CPT

## 2019-12-07 PROCEDURE — 84439 ASSAY OF FREE THYROXINE: CPT

## 2019-12-07 PROCEDURE — 85025 COMPLETE CBC W/AUTO DIFF WBC: CPT

## 2019-12-07 PROCEDURE — 36415 COLL VENOUS BLD VENIPUNCTURE: CPT

## 2019-12-07 PROCEDURE — 84155 ASSAY OF PROTEIN SERUM: CPT

## 2019-12-07 PROCEDURE — 80053 COMPREHEN METABOLIC PANEL: CPT

## 2019-12-07 PROCEDURE — 82306 VITAMIN D 25 HYDROXY: CPT

## 2019-12-07 PROCEDURE — 80061 LIPID PANEL: CPT

## 2019-12-10 LAB
ALBUMIN SERPL ELPH-MCNC: 3.77 G/DL (ref 3.75–5.01)
ALPHA1 GLOB SERPL ELPH-MCNC: 0.27 G/DL (ref 0.19–0.46)
ALPHA2 GLOB SERPL ELPH-MCNC: 0.8 G/DL (ref 0.48–1.05)
B-GLOBULIN SERPL ELPH-MCNC: 0.99 G/DL (ref 0.48–1.1)
GAMMA GLOB SERPL ELPH-MCNC: 1.27 G/DL (ref 0.62–1.51)
INTERPRETATION SERPL IFE-IMP: NORMAL
MONOCLON BAND OBS SERPL: NORMAL
PATHOLOGY STUDY: NORMAL
PROT SERPL-MCNC: 7.1 G/DL (ref 6.3–8.2)

## 2019-12-15 ENCOUNTER — HOSPITAL ENCOUNTER (OUTPATIENT)
Dept: RADIOLOGY | Facility: MEDICAL CENTER | Age: 59
End: 2019-12-15
Attending: INTERNAL MEDICINE
Payer: COMMERCIAL

## 2019-12-15 DIAGNOSIS — Z91.89 OTHER SPECIFIED PERSONAL RISK FACTORS, NOT ELSEWHERE CLASSIFIED: ICD-10-CM

## 2019-12-15 PROCEDURE — 4410556 CT-CARDIAC SCORING

## 2020-02-21 RX ORDER — LEVOTHYROXINE SODIUM 75 MCG
TABLET ORAL
Qty: 90 TAB | Refills: 3 | Status: SHIPPED | OUTPATIENT
Start: 2020-02-21 | End: 2021-02-22 | Stop reason: SDUPTHER

## 2020-02-21 NOTE — TELEPHONE ENCOUNTER
----- Message from Fabiana Flores sent at 2/21/2020 10:08 AM PST -----  Regarding: Prescription Question  Contact: [phone]  Good morning  Dr Jarrell,     Hope all is well at your end. I would appreciate if the Synthroid medication 0.075 mcg renewal/ refill information is sent to the [facility] on [address]. I have been picking up a 90 day supply from them. I have a few left. Please let me me know if this will be done electronically or I should pick it from your office.    Thank you   Fabiana Flores

## 2020-02-21 NOTE — TELEPHONE ENCOUNTER
Received request via: Patient    Was the patient seen in the last year in this department? Yes  12/2019  Does the patient have an active prescription (recently filled or refills available) for medication(s) requested? No

## 2020-02-27 ENCOUNTER — TELEPHONE (OUTPATIENT)
Dept: MEDICAL GROUP | Facility: LAB | Age: 60
End: 2020-02-27

## 2020-02-27 NOTE — TELEPHONE ENCOUNTER
Debbie:  No problem with a shingles vaccination can find no indication to do pneumonia shot early!  Regards, Ray Irizarry, DO

## 2020-02-27 NOTE — TELEPHONE ENCOUNTER
1. Caller Name: Bobby                        Call Back Number:       How would the patient prefer to be contacted with a response: Phone call OK to leave a detailed message    Pt is wanting to get the shingles shot which is covered by her insurance as long as she gets this at the doctors office.  She is wondering is she is in need of a pneumonia shot prior to her turning 66.

## 2020-03-27 NOTE — TELEPHONE ENCOUNTER
VOICEMAIL  1. Caller Name: Fabiana Flores                        Call Back Number: [phone] (home)       2. Message: patient called and stated that Dr Joseph's office called and tried to schedule her with a nurse practitioner on Monday but she would prefer to see doctor Joseph.     3. Patient approves office to leave a detailed voicemail/MyChart message: yes       Breast CA    CKD (chronic kidney disease) stage 3, GFR 30-59 ml/min    Diabetes    Diastolic CHF, chronic    Gastric carcinoma    Hyperthyroidism    Pleural effusion 27-Mar-2020

## 2020-06-05 ENCOUNTER — OFFICE VISIT (OUTPATIENT)
Dept: MEDICAL GROUP | Facility: LAB | Age: 60
End: 2020-06-05
Payer: COMMERCIAL

## 2020-06-05 VITALS
HEIGHT: 64 IN | OXYGEN SATURATION: 95 % | HEART RATE: 74 BPM | BODY MASS INDEX: 24.65 KG/M2 | TEMPERATURE: 98.7 F | SYSTOLIC BLOOD PRESSURE: 140 MMHG | DIASTOLIC BLOOD PRESSURE: 70 MMHG | RESPIRATION RATE: 12 BRPM | WEIGHT: 144.4 LBS

## 2020-06-05 DIAGNOSIS — G89.29 CHRONIC LOW BACK PAIN, UNSPECIFIED BACK PAIN LATERALITY, UNSPECIFIED WHETHER SCIATICA PRESENT: ICD-10-CM

## 2020-06-05 DIAGNOSIS — E03.9 ACQUIRED HYPOTHYROIDISM: ICD-10-CM

## 2020-06-05 DIAGNOSIS — M54.50 CHRONIC LOW BACK PAIN, UNSPECIFIED BACK PAIN LATERALITY, UNSPECIFIED WHETHER SCIATICA PRESENT: ICD-10-CM

## 2020-06-05 DIAGNOSIS — G43.109 MIGRAINE WITH AURA AND WITHOUT STATUS MIGRAINOSUS, NOT INTRACTABLE: ICD-10-CM

## 2020-06-05 DIAGNOSIS — E78.5 DYSLIPIDEMIA: ICD-10-CM

## 2020-06-05 DIAGNOSIS — M81.8 OTHER OSTEOPOROSIS WITHOUT CURRENT PATHOLOGICAL FRACTURE: ICD-10-CM

## 2020-06-05 PROCEDURE — 99214 OFFICE O/P EST MOD 30 MIN: CPT | Performed by: INTERNAL MEDICINE

## 2020-06-05 RX ORDER — HYDROCODONE BITARTRATE AND ACETAMINOPHEN 5; 325 MG/1; MG/1
.5-1 TABLET ORAL NIGHTLY PRN
Qty: 15 TAB | Refills: 0 | Status: SHIPPED | OUTPATIENT
Start: 2020-06-05 | End: 2020-07-05

## 2020-06-05 ASSESSMENT — PATIENT HEALTH QUESTIONNAIRE - PHQ9: CLINICAL INTERPRETATION OF PHQ2 SCORE: 0

## 2020-06-05 ASSESSMENT — FIBROSIS 4 INDEX: FIB4 SCORE: 1.19
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
## 2020-12-22 ENCOUNTER — OFFICE VISIT (OUTPATIENT)
Dept: MEDICAL GROUP | Facility: LAB | Age: 60
End: 2020-12-22
Payer: COMMERCIAL

## 2020-12-22 VITALS
DIASTOLIC BLOOD PRESSURE: 72 MMHG | HEART RATE: 79 BPM | HEIGHT: 65 IN | WEIGHT: 147.05 LBS | OXYGEN SATURATION: 97 % | SYSTOLIC BLOOD PRESSURE: 118 MMHG | TEMPERATURE: 95.8 F | BODY MASS INDEX: 24.5 KG/M2

## 2020-12-22 DIAGNOSIS — R89.9 ABNORMAL LABORATORY TEST: ICD-10-CM

## 2020-12-22 DIAGNOSIS — S22.070D CLOSED WEDGE COMPRESSION FRACTURE OF T10 VERTEBRA WITH ROUTINE HEALING, SUBSEQUENT ENCOUNTER: ICD-10-CM

## 2020-12-22 PROCEDURE — 99213 OFFICE O/P EST LOW 20 MIN: CPT | Performed by: INTERNAL MEDICINE

## 2020-12-22 ASSESSMENT — FIBROSIS 4 INDEX: FIB4 SCORE: 1.19

## 2020-12-22 NOTE — LETTER
December 22, 2020        Fabiana Flores  [address] [place] [address]        Dear Fabiana:    Here is the letter for work as discussed.    If you have any questions or concerns, please don't hesitate to call.        Sincerely,        Ray Irizarry D.O.    Electronically Signed      Post op gtt instructions reviewed with patient.
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
## 2021-02-22 RX ORDER — LEVOTHYROXINE SODIUM 75 MCG
TABLET ORAL
Qty: 90 TABLET | Refills: 3 | Status: SHIPPED | OUTPATIENT
Start: 2021-02-22 | End: 2022-02-01 | Stop reason: SDUPTHER

## 2021-02-22 NOTE — TELEPHONE ENCOUNTER
----- Message from Fabiana Flores sent at 2/21/2021  8:00 PM PST -----  Regarding: Prescription Question  Contact: [phone]  Hello,    I would appreciate if my Synthroid prescription paperwork is sent to [facility].  Their phone is [phone]  Electronic message to [employer]. Their address is [address] [place] [address]. Fax number [phone]  I have 2 weeks of medication with me.    Thank you.  Fabiana Flores

## 2021-03-11 ENCOUNTER — PATIENT MESSAGE (OUTPATIENT)
Dept: MEDICAL GROUP | Facility: LAB | Age: 61
End: 2021-03-11

## 2021-03-11 DIAGNOSIS — B00.1 RECURRENT COLD SORES: ICD-10-CM

## 2021-03-11 RX ORDER — ACYCLOVIR 50 MG/G
1 OINTMENT TOPICAL
Qty: 1 EACH | Refills: 0 | Status: SHIPPED
Start: 2021-03-11 | End: 2021-06-15

## 2021-03-11 NOTE — PATIENT COMMUNICATION
Received request via: Patient    Was the patient seen in the last year in this department? Yes  12/2020    Does the patient have an active prescription (recently filled or refills available) for medication(s) requested? No

## 2021-03-11 NOTE — TELEPHONE ENCOUNTER
From: Fabiana Flores  To: Physician Ray Irizarry  Sent: 3/11/2021 11:20 AM PST  Subject: Prescription Question    Good morning Dr Irizarry,    I would appreciate it if you can in call-in Zovirax as I have a cold sore. It can be called to the [facility] pharmacy on [address].     Thank you.     Fabiana Flores

## 2021-03-15 DIAGNOSIS — Z23 NEED FOR VACCINATION: ICD-10-CM

## 2021-05-12 ENCOUNTER — PATIENT MESSAGE (OUTPATIENT)
Dept: MEDICAL GROUP | Facility: LAB | Age: 61
End: 2021-05-12

## 2021-05-12 DIAGNOSIS — Z12.31 ENCOUNTER FOR SCREENING MAMMOGRAM FOR MALIGNANT NEOPLASM OF BREAST: ICD-10-CM

## 2021-05-12 NOTE — TELEPHONE ENCOUNTER
From: Fabiana Flores  To: Physician Ray Irizarry  Sent: 5/12/2021 4:12 PM PDT  Subject: Procedure Question    Hello Dr Jarrell,     Hope you are doing well. I am overdue for a mammogram and would like to request a screening mammogram in addition to whole breast ultrasound due to dense breasts and a family history of breast cancer( sister).   [facility] is requesting for you to put an order in and will also reach out to you.    I have my appt for 5/19/2021 at 4:30 pm with the [facility] location.     Thank you. Fabiana Flores

## 2021-05-13 ENCOUNTER — PATIENT MESSAGE (OUTPATIENT)
Dept: MEDICAL GROUP | Facility: LAB | Age: 61
End: 2021-05-13

## 2021-05-13 DIAGNOSIS — Z12.31 ENCOUNTER FOR SCREENING MAMMOGRAM FOR MALIGNANT NEOPLASM OF BREAST: ICD-10-CM

## 2021-05-14 NOTE — TELEPHONE ENCOUNTER
From: Fabiana Flores  To: Physician Ray Irizarry  Sent: 5/13/2021 1:24 PM PDT  Subject: Procedure Question    Thank you.      ----- Message -----   From:Physician Ray Irizarry   Sent:5/13/2021 1:13 PM PDT   To:Fabiana Flores   Subject:RE: Procedure Question    Fabiana:  Orders signed for ultrasound, also screening mammogram.   Regards, Ray Irizarry, DO      ----- Message -----   From:Fabiana Flores   Sent:5/12/2021 4:12 PM PDT   To:Physician Ray Irizarry   Subject:Procedure Question    Liza Jarrell,     Hope you are doing well. I am overdue for a mammogram and would like to request a screening mammogram in addition to whole breast ultrasound due to dense breasts and a family history of breast cancer( sister).   [facility] is requesting for you to put an order in and will also reach out to you.    I have my appt for 5/19/2021 at 4:30 pm with the [facility] location.     Thank you. Fabiana Flores

## 2021-05-19 ENCOUNTER — HOSPITAL ENCOUNTER (OUTPATIENT)
Dept: RADIOLOGY | Facility: MEDICAL CENTER | Age: 61
End: 2021-05-19
Attending: INTERNAL MEDICINE
Payer: COMMERCIAL

## 2021-05-19 DIAGNOSIS — Z12.31 ENCOUNTER FOR SCREENING MAMMOGRAM FOR MALIGNANT NEOPLASM OF BREAST: ICD-10-CM

## 2021-05-19 DIAGNOSIS — Z12.31 VISIT FOR SCREENING MAMMOGRAM: ICD-10-CM

## 2021-05-19 PROCEDURE — 77063 BREAST TOMOSYNTHESIS BI: CPT

## 2021-05-19 PROCEDURE — 76641 ULTRASOUND BREAST COMPLETE: CPT

## 2021-06-15 ENCOUNTER — OFFICE VISIT (OUTPATIENT)
Dept: MEDICAL GROUP | Facility: LAB | Age: 61
End: 2021-06-15
Payer: COMMERCIAL

## 2021-06-15 VITALS
RESPIRATION RATE: 18 BRPM | WEIGHT: 147.71 LBS | HEIGHT: 65 IN | HEART RATE: 82 BPM | DIASTOLIC BLOOD PRESSURE: 88 MMHG | OXYGEN SATURATION: 97 % | TEMPERATURE: 97.4 F | SYSTOLIC BLOOD PRESSURE: 124 MMHG | BODY MASS INDEX: 24.61 KG/M2

## 2021-06-15 DIAGNOSIS — S22.070G CLOSED WEDGE COMPRESSION FRACTURE OF T10 VERTEBRA WITH DELAYED HEALING, SUBSEQUENT ENCOUNTER: ICD-10-CM

## 2021-06-15 PROCEDURE — 99213 OFFICE O/P EST LOW 20 MIN: CPT | Performed by: INTERNAL MEDICINE

## 2021-06-15 RX ORDER — PREGABALIN 25 MG/1
25 CAPSULE ORAL 2 TIMES DAILY
Qty: 60 CAPSULE | Refills: 2 | Status: SHIPPED | OUTPATIENT
Start: 2021-06-15 | End: 2021-07-15

## 2021-06-15 RX ORDER — CELECOXIB 100 MG/1
100 CAPSULE ORAL 2 TIMES DAILY
Qty: 60 CAPSULE | Refills: 5 | Status: SHIPPED | OUTPATIENT
Start: 2021-06-15 | End: 2023-11-06

## 2021-06-15 ASSESSMENT — PATIENT HEALTH QUESTIONNAIRE - PHQ9: CLINICAL INTERPRETATION OF PHQ2 SCORE: 0

## 2021-06-15 ASSESSMENT — FIBROSIS 4 INDEX: FIB4 SCORE: 1.21

## 2021-06-15 NOTE — LETTER
Taya 15, 2021        Patient: Fabiana Flores   YOB: 1960   Date of Visit: 6/15/2021           To Whom It May Concern:    It is my medical opinion that Fabiana Flores will need to limit work to no more than 5 to  6 hours each  day.   If you have any questions or concerns, please don't hesitate to call.        Sincerely,          Ray Irizarry D.O.  Board Certified General Internal Medicine.     [address] [id number] [phone] (Phone)  [phone] (Fax)

## 2021-06-15 NOTE — PROGRESS NOTES
CC: Fabiana Flores is a 61 y.o. female is suffering from   Chief Complaint   Patient presents with   • Back Pain     morning feels fine, worse throughout the day. getting worse.         SUBJECTIVE:  1. Closed wedge compression fracture of T10 vertebra with delayed healing, subsequent encounter  Patient and I have discussed today that she continues to have worsening problems with back pain discomfort associated with her previous T10 vertebral fracture.  Patient and I have discussed decreasing the number of hours that she works 5-6.  Letter dictated.  I recommend she start Celebrex twice daily at low-dose, will have her start also Lyrica to see if this improves her condition physical therapy will be reinitiated        Past social, family, history: , Aneil  Social History     Tobacco Use   • Smoking status: Never Smoker   • Smokeless tobacco: Never Used   Vaping Use   • Vaping Use: Never used   Substance Use Topics   • Alcohol use: No   • Drug use: No         MEDICATIONS:    Current Outpatient Medications:   •  celecoxib (CELEBREX) 100 MG Cap, Take 1 capsule by mouth 2 times a day., Disp: 60 capsule, Rfl: 5  •  pregabalin (LYRICA) 25 MG Cap, Take 1 capsule by mouth 2 times a day for 30 days., Disp: 60 capsule, Rfl: 2  •  acyclovir (ZOVIRAX) 5 % Ointment, Apply 1 Application topically every 3 hours., Disp: 1 Each, Rfl: 0  •  SYNTHROID 75 MCG Tab, TAKE ONE TABLET BY MOUTH EVERY MORNING ON AN EMPTY STOMACH, Disp: 90 tablet, Rfl: 3  •  Calcium Carbonate-Vit D-Min (CALCIUM 1200 PO), Take  by mouth., Disp: , Rfl:   •  vitamin D (CHOLECALCIFEROL) 1000 UNIT Tab, Take 1,000 Units by mouth every day., Disp: , Rfl:   •  albuterol 108 (90 BASE) MCG/ACT Aero Soln inhalation aerosol, Inhale 2 Puffs by mouth every 6 hours as needed for Shortness of Breath., Disp: 8.5 g, Rfl: 3  •  vitamin D, Ergocalciferol, (DRISDOL) 64878 UNITS Cap capsule, Take  by mouth every 7 days., Disp: , Rfl:   •  Calcium Carb-Cholecalciferol 600-800  MG-UNIT Tab, Take 2 tablet by mouth every day., Disp: 60 Tab, Rfl: 11  •  aspirin EC (ECOTRIN) 81 MG TBEC, Take 81 mg by mouth every day., Disp: , Rfl:   •  tobramycin (TOBREX) 0.3 % Solution ophthalmic solution, Place 1 Drop in both eyes every 4 hours. (Patient not taking: Reported on 6/5/2020), Disp: 1 Bottle, Rfl: 0  •  estrogens, conjugated (PREMARIN) 0.625 MG/GM Cream, 1/2 gram per vagina nightly for 14 days, then every other day. (Patient not taking: Reported on 6/5/2020), Disp: 1 Tube, Rfl: 2  •  azithromycin (ZITHROMAX) 250 MG Tab, Two day one then qd x 4. (Patient not taking: Reported on 11/30/2018), Disp: 6 Tab, Rfl: 0  •  MethylPREDNISolone (MEDROL DOSEPAK) 4 MG Tablet Therapy Pack, , Disp: , Rfl:   •  ondansetron (ZOFRAN ODT) 4 MG TABLET DISPERSIBLE, , Disp: , Rfl:   •  lidocaine (LIDODERM) 5 % Patch, Apply 1 Patch to skin as directed every 24 hours. as needed for nerve pain (Patient not taking: Reported on 11/30/2018), Disp: 30 Patch, Rfl: 1  •  methocarbamol (ROBAXIN) 500 MG Tab, Take 2 Tabs by mouth 3 times a day., Disp: 60 Tab, Rfl: 1  •  ondansetron (ZOFRAN) 4 MG Tab tablet, Take 1 Tab by mouth every 6 hours as needed for Nausea/Vomiting. (Patient not taking: Reported on 11/30/2018), Disp: 30 Tab, Rfl: 0  •  SUMAtriptan (IMITREX) 25 MG Tab tablet, Take 1-4 Tabs by mouth Once PRN for Migraine for up to 1 dose. (Patient not taking: Reported on 1/8/2019), Disp: 10 Tab, Rfl: 3  •  omeprazole (PRILOSEC) 40 MG delayed-release capsule, Take 1 Cap by mouth 1 time daily as needed. (Patient not taking: Reported on 11/30/2018), Disp: 30 Cap, Rfl: 6  •  fluticasone (FLONASE) 50 MCG/ACT nasal spray, Spray 2 Sprays in nose every day. (Patient not taking: Reported on 11/30/2018), Disp: 16 g, Rfl: 11  •  silver sulfADIAZINE (SILVADENE) 1 % Cream, Apply 0.5 g to affected area(s) every day. (Patient not taking: Reported on 11/30/2018), Disp: 30 g, Rfl: 3    PROBLEMS:  Patient Active Problem List    Diagnosis Date  "Noted   • Other osteoporosis without current pathological fracture 11/23/2019   • Dyslipidemia 11/30/2018   • Closed wedge compression fracture of T10 vertebra (HCC) 08/29/2017   • Migraine with aura and without status migrainosus, not intractable 03/10/2017   • Acquired hypothyroidism 10/13/2016   • Vaginal atrophy 10/13/2016       REVIEW OF SYSTEMS:  Gen.:  No Nausea, Vomiting, fever, Chills.  Heart: No chest pain.  Lungs:  No shortness of Breath.  Psychological: Collin unusual Anxiety depression     PHYSICAL EXAM   Constitutional: Alert, cooperative, not in acute distress.  Cardiovascular:  Rate Rhythm is regular without murmurs rubs clicks.     Thorax & Lungs: Clear to auscultation, no wheezing, rhonchi, or rales  HENT: Normocephalic, Atraumatic.  Eyes: PERRLA, EOMI, Conjunctiva normal.   Neck: Trachia is midline no swelling of the thyroid.   Lymphatic: No lymphadenopathy noted.   Musculoskeletal: Pain to palpation approximately T10 thoracic spine  Neurologic: Alert & oriented x 3, cranial nerves II through XII are intact, Normal motor function, Normal sensory function, No focal deficits noted.   Psychiatric: Affect normal, Judgment normal, Mood normal.     VITAL SIGNS:/88 (BP Location: Left arm, Patient Position: Sitting, BP Cuff Size: Adult)   Pulse 82   Temp 36.3 °C (97.4 °F) (Temporal)   Resp 18   Ht 1.651 m (5' 5\")   Wt 67 kg (147 lb 11.3 oz)   LMP 09/10/2007   SpO2 97%   BMI 24.58 kg/m²     Labs: Reviewed    Assessment:                                                     Plan:    1. Closed wedge compression fracture of T10 vertebra with delayed healing, subsequent encounter  Letter dictated regarding work, patient is to start Celebrex 10 mg twice daily start Lyrica low dose warned patient about side effects warned patient should she stop Lyrica to taper off slowly.  - REFERRAL TO PHYSICAL THERAPY  - celecoxib (CELEBREX) 100 MG Cap; Take 1 capsule by mouth 2 times a day.  Dispense: 60 capsule; " Refill: 5  - pregabalin (LYRICA) 25 MG Cap; Take 1 capsule by mouth 2 times a day for 30 days.  Dispense: 60 capsule; Refill: 2

## 2021-07-10 ENCOUNTER — HOSPITAL ENCOUNTER (OUTPATIENT)
Dept: LAB | Facility: MEDICAL CENTER | Age: 61
End: 2021-07-10
Attending: INTERNAL MEDICINE
Payer: COMMERCIAL

## 2021-07-10 DIAGNOSIS — R89.9 ABNORMAL LABORATORY TEST: ICD-10-CM

## 2021-07-10 LAB — ALP SERPL-CCNC: 124 U/L (ref 30–99)

## 2021-07-10 PROCEDURE — 36415 COLL VENOUS BLD VENIPUNCTURE: CPT

## 2021-07-10 PROCEDURE — 84075 ASSAY ALKALINE PHOSPHATASE: CPT

## 2021-07-27 ENCOUNTER — OFFICE VISIT (OUTPATIENT)
Dept: MEDICAL GROUP | Facility: LAB | Age: 61
End: 2021-07-27
Payer: COMMERCIAL

## 2021-07-27 VITALS
BODY MASS INDEX: 24.16 KG/M2 | RESPIRATION RATE: 12 BRPM | HEART RATE: 89 BPM | WEIGHT: 145 LBS | DIASTOLIC BLOOD PRESSURE: 80 MMHG | SYSTOLIC BLOOD PRESSURE: 135 MMHG | OXYGEN SATURATION: 97 % | TEMPERATURE: 97 F | HEIGHT: 65 IN

## 2021-07-27 DIAGNOSIS — S22.070D CLOSED WEDGE COMPRESSION FRACTURE OF T10 VERTEBRA WITH ROUTINE HEALING, SUBSEQUENT ENCOUNTER: ICD-10-CM

## 2021-07-27 DIAGNOSIS — E55.9 VITAMIN D DEFICIENCY: ICD-10-CM

## 2021-07-27 PROCEDURE — 99213 OFFICE O/P EST LOW 20 MIN: CPT | Performed by: INTERNAL MEDICINE

## 2021-07-27 ASSESSMENT — FIBROSIS 4 INDEX: FIB4 SCORE: 1.21

## 2021-07-27 NOTE — PROGRESS NOTES
CC: Fabiana Flores is a 61 y.o. female is suffering from   Chief Complaint   Patient presents with   • Back Pain     6 weeks follow up, home exercises and celebrex   • Lab Results     discuss recent lab work         SUBJECTIVE:  1. Vitamin D deficiency  Fabiana is here for follow-up, continues to have vitamin D deficiency which has been treated with 2000 international units each day we discussed the importance of continuing on this medication    2. Closed wedge compression fracture of T10 vertebra with routine healing, subsequent encounter  Patient with a closed wedge fracture of the T10 vertebral body with routine healing.  Patient continues to have structural problems with back pain because of this.  Letter redictated regarding limiting her time at work        Past social, family, history: , Rick  Social History     Tobacco Use   • Smoking status: Never Smoker   • Smokeless tobacco: Never Used   Vaping Use   • Vaping Use: Never used   Substance Use Topics   • Alcohol use: No   • Drug use: No         MEDICATIONS:    Current Outpatient Medications:   •  celecoxib (CELEBREX) 100 MG Cap, Take 1 capsule by mouth 2 times a day., Disp: 60 capsule, Rfl: 5  •  SYNTHROID 75 MCG Tab, TAKE ONE TABLET BY MOUTH EVERY MORNING ON AN EMPTY STOMACH, Disp: 90 tablet, Rfl: 3  •  vitamin D (CHOLECALCIFEROL) 1000 UNIT Tab, Take 1,000 Units by mouth every day., Disp: , Rfl:   •  Calcium Carb-Cholecalciferol 600-800 MG-UNIT Tab, Take 2 tablet by mouth every day., Disp: 60 Tab, Rfl: 11    PROBLEMS:  Patient Active Problem List    Diagnosis Date Noted   • Other osteoporosis without current pathological fracture 11/23/2019   • Dyslipidemia 11/30/2018   • Closed wedge compression fracture of T10 vertebra (HCC) 08/29/2017   • Migraine with aura and without status migrainosus, not intractable 03/10/2017   • Acquired hypothyroidism 10/13/2016   • Vaginal atrophy 10/13/2016       REVIEW OF SYSTEMS:  Gen.:  No Nausea, Vomiting, fever,  "Chills.  Heart: No chest pain.  Lungs:  No shortness of Breath.  Psychological: Collin unusual Anxiety depression     PHYSICAL EXAM   Constitutional: Alert, cooperative, not in acute distress.  Cardiovascular:  Rate Rhythm is regular without murmurs rubs clicks.     Thorax & Lungs: Clear to auscultation, no wheezing, rhonchi, or rales  HENT: Normocephalic, Atraumatic.  Eyes: PERRLA, EOMI, Conjunctiva normal.   Neck: Trachia is midline no swelling of the thyroid.   Lymphatic: No lymphadenopathy noted.   Neurologic: Alert & oriented x 3, cranial nerves II through XII are intact, Normal motor function, Normal sensory function, No focal deficits noted.   Psychiatric: Affect normal, Judgment normal, Mood normal.     VITAL SIGNS:/80   Pulse 89   Temp 36.1 °C (97 °F) (Temporal)   Resp 12   Ht 1.651 m (5' 5\")   Wt 65.8 kg (145 lb)   LMP 09/10/2007   SpO2 97%   BMI 24.13 kg/m²     Labs: Reviewed    Assessment:                                                     Plan:    1. Vitamin D deficiency  Continue vitamin D supplementation  - VITAMIN D,25 HYDROXY; Future    2. Closed wedge compression fracture of T10 vertebra with routine healing, subsequent encounter  Encourage patient to continue doing physical therapy at home agree with not using Lyrica because of concerns and expense associated with medication.  Patient is tolerating Celebrex well.        "

## 2021-07-27 NOTE — LETTER
Patient: Fabiana Flores   Date of Birth:    Date of Visit: 7/27/2021           To Whom It May Concern:    It is my medical opinion that Fabiana Flores will need to limit work to 6 hours each day as tollerated.   If you have any questions or concerns, please don't hesitate to call.        Sincerely,          Ray Irizarry D.O.  Board Certified General Internal Medicine.     [address] [address] [phone] (Phone)  [phone] (Fax)

## 2021-08-31 ENCOUNTER — OFFICE VISIT (OUTPATIENT)
Dept: MEDICAL GROUP | Facility: LAB | Age: 61
End: 2021-08-31
Payer: COMMERCIAL

## 2021-08-31 VITALS
WEIGHT: 143 LBS | DIASTOLIC BLOOD PRESSURE: 85 MMHG | RESPIRATION RATE: 12 BRPM | HEART RATE: 90 BPM | HEIGHT: 64 IN | SYSTOLIC BLOOD PRESSURE: 138 MMHG | OXYGEN SATURATION: 96 % | TEMPERATURE: 97 F | BODY MASS INDEX: 24.41 KG/M2

## 2021-08-31 DIAGNOSIS — M54.2 NECK PAIN: ICD-10-CM

## 2021-08-31 DIAGNOSIS — M54.6 THORACIC SPINE PAIN: ICD-10-CM

## 2021-08-31 DIAGNOSIS — M54.50 LOW BACK PAIN, UNSPECIFIED BACK PAIN LATERALITY, UNSPECIFIED CHRONICITY, UNSPECIFIED WHETHER SCIATICA PRESENT: ICD-10-CM

## 2021-08-31 PROCEDURE — 99214 OFFICE O/P EST MOD 30 MIN: CPT | Performed by: INTERNAL MEDICINE

## 2021-08-31 RX ORDER — METHOCARBAMOL 500 MG/1
1000 TABLET, FILM COATED ORAL 3 TIMES DAILY
Qty: 60 TABLET | Refills: 1 | Status: SHIPPED | OUTPATIENT
Start: 2021-08-31 | End: 2021-08-31 | Stop reason: SDUPTHER

## 2021-08-31 RX ORDER — METHOCARBAMOL 500 MG/1
1000 TABLET, FILM COATED ORAL 3 TIMES DAILY
Qty: 60 TABLET | Refills: 1 | Status: SHIPPED | OUTPATIENT
Start: 2021-08-31 | End: 2024-01-25 | Stop reason: CLARIF

## 2021-08-31 ASSESSMENT — FIBROSIS 4 INDEX: FIB4 SCORE: 1.21
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
## 2021-08-31 NOTE — LETTER
August 31, 2021        Patient: Fabiana Flores   YOB: 1960   Date of Visit: 8/31/2021     RE: ultrasound of May 19, 2021.       To Whom It May Concern:    It is my medical opinion that Fabiana Flores needs an ultrasound of the breast due to a family history of breast cancer, sister, and dense breast tissue.    If you have any questions or concerns, please don't hesitate to call.        Sincerely,          Ray Irizarry D.O.  Board Certified General Internal Medicine.      [address] [address] [phone] (Phone)  [phone] (Fax)

## 2021-09-03 ENCOUNTER — HOSPITAL ENCOUNTER (OUTPATIENT)
Dept: RADIOLOGY | Facility: MEDICAL CENTER | Age: 61
End: 2021-09-03
Attending: INTERNAL MEDICINE
Payer: COMMERCIAL

## 2021-09-03 DIAGNOSIS — M54.6 THORACIC SPINE PAIN: ICD-10-CM

## 2021-09-03 DIAGNOSIS — M54.50 LOW BACK PAIN, UNSPECIFIED BACK PAIN LATERALITY, UNSPECIFIED CHRONICITY, UNSPECIFIED WHETHER SCIATICA PRESENT: ICD-10-CM

## 2021-09-03 DIAGNOSIS — M54.2 NECK PAIN: ICD-10-CM

## 2021-09-03 PROCEDURE — 72040 X-RAY EXAM NECK SPINE 2-3 VW: CPT

## 2021-09-03 PROCEDURE — 72100 X-RAY EXAM L-S SPINE 2/3 VWS: CPT

## 2021-09-03 PROCEDURE — 72070 X-RAY EXAM THORAC SPINE 2VWS: CPT

## 2021-09-10 ENCOUNTER — TELEPHONE (OUTPATIENT)
Dept: MEDICAL GROUP | Facility: LAB | Age: 61
End: 2021-09-10

## 2021-09-10 NOTE — TELEPHONE ENCOUNTER
1. Caller Name: Fabiana                         Call Back Number: [phone] (home)        How would the patient prefer to be contacted with a response: Phone call OK to leave a detailed message    Pt is asking for recent xray results.

## 2021-09-11 NOTE — TELEPHONE ENCOUNTER
Debbie:  Call Fabiana xrays look Ok, still T10 compression fracture.   Regards, Ray Irizarry, DO

## 2021-10-11 ENCOUNTER — TELEPHONE (OUTPATIENT)
Dept: MEDICAL GROUP | Facility: LAB | Age: 61
End: 2021-10-11

## 2021-10-11 NOTE — TELEPHONE ENCOUNTER
Kiesha:  Please call Fabiana, antibiotics are only indicated if there is cellulitis, if there is no cellulitis I would just use warm compresses.   Regards, Ray Irizarry, DO

## 2021-10-11 NOTE — TELEPHONE ENCOUNTER
1. Caller Name: Fabiana Flores                          Call Back Number: [phone] (home)         How would the patient prefer to be contacted with a response:     Pt has a stye asking for medication sent to [facility]. She has been applying a warm compress. Pt has appt tomorrow.

## 2021-10-12 ENCOUNTER — APPOINTMENT (OUTPATIENT)
Dept: MEDICAL GROUP | Facility: LAB | Age: 61
End: 2021-10-12
Payer: COMMERCIAL

## 2021-10-21 ENCOUNTER — OFFICE VISIT (OUTPATIENT)
Dept: MEDICAL GROUP | Facility: LAB | Age: 61
End: 2021-10-21
Payer: COMMERCIAL

## 2021-10-21 VITALS
HEIGHT: 65 IN | DIASTOLIC BLOOD PRESSURE: 80 MMHG | OXYGEN SATURATION: 99 % | BODY MASS INDEX: 23.32 KG/M2 | WEIGHT: 140 LBS | SYSTOLIC BLOOD PRESSURE: 132 MMHG | RESPIRATION RATE: 12 BRPM | HEART RATE: 78 BPM | TEMPERATURE: 97 F
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
## 2021-10-21 DIAGNOSIS — M54.2 NECK PAIN: ICD-10-CM

## 2021-10-21 DIAGNOSIS — M79.602 DIFFUSE PAIN IN LEFT UPPER EXTREMITY: ICD-10-CM

## 2021-10-21 DIAGNOSIS — Z23 NEED FOR IMMUNIZATION AGAINST INFLUENZA: ICD-10-CM

## 2021-10-21 PROCEDURE — 99214 OFFICE O/P EST MOD 30 MIN: CPT | Mod: 25 | Performed by: INTERNAL MEDICINE

## 2021-10-21 PROCEDURE — 90686 IIV4 VACC NO PRSV 0.5 ML IM: CPT | Performed by: INTERNAL MEDICINE

## 2021-10-21 PROCEDURE — 90471 IMMUNIZATION ADMIN: CPT | Performed by: INTERNAL MEDICINE

## 2021-10-21 ASSESSMENT — FIBROSIS 4 INDEX: FIB4 SCORE: 1.21
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
## 2021-12-23 ENCOUNTER — OFFICE VISIT (OUTPATIENT)
Dept: MEDICAL GROUP | Facility: LAB | Age: 61
End: 2021-12-23
Payer: COMMERCIAL

## 2021-12-23 VITALS
HEART RATE: 69 BPM | OXYGEN SATURATION: 98 % | WEIGHT: 140.43 LBS | TEMPERATURE: 98.1 F | BODY MASS INDEX: 23.4 KG/M2 | HEIGHT: 65 IN

## 2021-12-23 DIAGNOSIS — G89.29 CHRONIC LEFT SHOULDER PAIN: ICD-10-CM

## 2021-12-23 DIAGNOSIS — N95.1 MENOPAUSAL STATE: ICD-10-CM

## 2021-12-23 DIAGNOSIS — M54.2 NECK PAIN: ICD-10-CM

## 2021-12-23 DIAGNOSIS — M25.512 CHRONIC LEFT SHOULDER PAIN: ICD-10-CM

## 2021-12-23 PROCEDURE — 99214 OFFICE O/P EST MOD 30 MIN: CPT | Performed by: INTERNAL MEDICINE
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
## 2021-12-28 ENCOUNTER — TELEPHONE (OUTPATIENT)
Dept: MEDICAL GROUP | Facility: LAB | Age: 61
End: 2021-12-28

## 2021-12-28 ENCOUNTER — HOSPITAL ENCOUNTER (OUTPATIENT)
Dept: RADIOLOGY | Facility: MEDICAL CENTER | Age: 61
End: 2021-12-28
Attending: INTERNAL MEDICINE
Payer: COMMERCIAL

## 2021-12-28 DIAGNOSIS — N95.1 MENOPAUSAL STATE: ICD-10-CM

## 2021-12-28 DIAGNOSIS — Z78.0 POSTMENOPAUSAL STATUS (AGE-RELATED) (NATURAL): ICD-10-CM

## 2021-12-28 PROCEDURE — 77080 DXA BONE DENSITY AXIAL: CPT

## 2021-12-30 DIAGNOSIS — B00.1 RECURRENT COLD SORES: ICD-10-CM

## 2021-12-30 RX ORDER — ACYCLOVIR 50 MG/G
1 OINTMENT TOPICAL
Qty: 1 EACH | Refills: 0 | Status: SHIPPED | OUTPATIENT
Start: 2021-12-30 | End: 2023-05-28 | Stop reason: SDUPTHER

## 2021-12-30 NOTE — TELEPHONE ENCOUNTER
----- Message from Fabiana Flores sent at 12/29/2021  5:15 PM PST -----  Regarding: Cold sore  Liza Jarrell,    I have developed some cold sores and would appreciate if you can call in Zovirax ointment into the [facility] pharmacy.    Thank you.    Fabiana Flores

## 2021-12-30 NOTE — TELEPHONE ENCOUNTER
Received request via: Pharmacy    Was the patient seen in the last year in this department? Yes  12/23/21  Does the patient have an active prescription (recently filled or refills available) for medication(s) requested? No

## 2022-01-04 ENCOUNTER — PATIENT MESSAGE (OUTPATIENT)
Dept: MEDICAL GROUP | Facility: LAB | Age: 62
End: 2022-01-04

## 2022-01-04 DIAGNOSIS — R74.8 ELEVATED ALKALINE PHOSPHATASE LEVEL: ICD-10-CM

## 2022-01-21 ENCOUNTER — PATIENT MESSAGE (OUTPATIENT)
Dept: MEDICAL GROUP | Facility: LAB | Age: 62
End: 2022-01-21

## 2022-01-24 ENCOUNTER — PATIENT MESSAGE (OUTPATIENT)
Dept: MEDICAL GROUP | Facility: LAB | Age: 62
End: 2022-01-24

## 2022-02-01 RX ORDER — LEVOTHYROXINE SODIUM 75 MCG
TABLET ORAL
Qty: 90 TABLET | Refills: 3 | Status: SHIPPED | OUTPATIENT
Start: 2022-02-01 | End: 2023-01-24 | Stop reason: SDUPTHER

## 2022-02-03 ENCOUNTER — PATIENT MESSAGE (OUTPATIENT)
Dept: MEDICAL GROUP | Facility: LAB | Age: 62
End: 2022-02-03

## 2022-04-01 ENCOUNTER — TELEPHONE (OUTPATIENT)
Dept: MEDICAL GROUP | Facility: LAB | Age: 62
End: 2022-04-01
Payer: COMMERCIAL
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
## 2022-06-20 ENCOUNTER — PATIENT MESSAGE (OUTPATIENT)
Dept: MEDICAL GROUP | Facility: LAB | Age: 62
End: 2022-06-20
Payer: COMMERCIAL

## 2022-06-20 ENCOUNTER — HOSPITAL ENCOUNTER (OUTPATIENT)
Dept: RADIOLOGY | Facility: MEDICAL CENTER | Age: 62
End: 2022-06-20
Attending: INTERNAL MEDICINE
Payer: COMMERCIAL

## 2022-06-20 DIAGNOSIS — M79.642 LEFT HAND PAIN: ICD-10-CM

## 2022-06-20 PROCEDURE — 73130 X-RAY EXAM OF HAND: CPT | Mod: LT

## 2022-06-23 ENCOUNTER — OFFICE VISIT (OUTPATIENT)
Dept: MEDICAL GROUP | Facility: LAB | Age: 62
End: 2022-06-23
Payer: COMMERCIAL

## 2022-06-23 VITALS
OXYGEN SATURATION: 100 % | WEIGHT: 141.09 LBS | HEART RATE: 80 BPM | TEMPERATURE: 96.4 F | DIASTOLIC BLOOD PRESSURE: 84 MMHG | BODY MASS INDEX: 23.51 KG/M2 | HEIGHT: 65 IN | SYSTOLIC BLOOD PRESSURE: 118 MMHG

## 2022-06-23 DIAGNOSIS — M54.6 THORACIC BACK PAIN, UNSPECIFIED BACK PAIN LATERALITY, UNSPECIFIED CHRONICITY: ICD-10-CM

## 2022-06-23 DIAGNOSIS — M79.642 LEFT HAND PAIN: ICD-10-CM

## 2022-06-23 PROCEDURE — 99213 OFFICE O/P EST LOW 20 MIN: CPT | Performed by: INTERNAL MEDICINE

## 2022-06-23 ASSESSMENT — PATIENT HEALTH QUESTIONNAIRE - PHQ9: CLINICAL INTERPRETATION OF PHQ2 SCORE: 0

## 2022-06-23 NOTE — PROGRESS NOTES
CC: Fabiana Flores is a 62 y.o. female is suffering from   Chief Complaint   Patient presents with   • Follow-Up   • Hand Injury     Left- slammed in door         SUBJECTIVE:  1. Left hand pain  Patient is here for follow-up, continues to have pain associate with her left hand.  States that it is improving.  Patient has pain to palpation middle metatarsal bone    2. Thoracic back pain, unspecified back pain laterality, unspecified chronicity  She is limiting her work to 7 to 9 hours a day, has a history of a chronic T10 fracture        Past social, family, history:   Social History     Tobacco Use   • Smoking status: Never Smoker   • Smokeless tobacco: Never Used   Vaping Use   • Vaping Use: Never used   Substance Use Topics   • Alcohol use: No   • Drug use: No         MEDICATIONS:    Current Outpatient Medications:   •  SYNTHROID 75 MCG Tab, TAKE ONE TABLET BY MOUTH EVERY MORNING ON AN EMPTY STOMACH, Disp: 90 Tablet, Rfl: 3  •  acyclovir (ZOVIRAX) 5 % Ointment, Apply 1 Application topically every 3 hours., Disp: 1 Each, Rfl: 0  •  methocarbamol (ROBAXIN) 500 MG Tab, Take 2 Tablets by mouth 3 times a day., Disp: 60 Tablet, Rfl: 1  •  celecoxib (CELEBREX) 100 MG Cap, Take 1 capsule by mouth 2 times a day., Disp: 60 capsule, Rfl: 5  •  vitamin D (CHOLECALCIFEROL) 1000 UNIT Tab, Take 1,000 Units by mouth every day., Disp: , Rfl:   •  Calcium Carb-Cholecalciferol 600-800 MG-UNIT Tab, Take 2 tablet by mouth every day., Disp: 60 Tab, Rfl: 11    PROBLEMS:  Patient Active Problem List    Diagnosis Date Noted   • Other osteoporosis without current pathological fracture 11/23/2019   • Dyslipidemia 11/30/2018   • Closed wedge compression fracture of T10 vertebra (HCC) 08/29/2017   • Migraine with aura and without status migrainosus, not intractable 03/10/2017   • Acquired hypothyroidism 10/13/2016   • Vaginal atrophy 10/13/2016       REVIEW OF SYSTEMS:  Gen.:  No Nausea, Vomiting, fever, Chills.  Heart: No chest  "pain.  Lungs:  No shortness of Breath.  Psychological: Collin unusual Anxiety depression     PHYSICAL EXAM   Constitutional: Alert, cooperative, not in acute distress.  Cardiovascular:  Rate Rhythm is regular without murmurs rubs clicks.     Thorax & Lungs: Clear to auscultation, no wheezing, rhonchi, or rales  HENT: Normocephalic, Atraumatic.  Eyes: PERRLA, EOMI, Conjunctiva normal.   Neck: Trachia is midline no swelling of the thyroid.   Musculoskeletal: Pain to palpation middle metatarsal joint left hand  Neurologic: Alert & oriented x 3, cranial nerves II through XII are intact, Normal motor function, Normal sensory function, No focal deficits noted.   Psychiatric: Affect normal, Judgment normal, Mood normal.     VITAL SIGNS:/84 (BP Location: Left arm, Patient Position: Sitting, BP Cuff Size: Adult)   Pulse 80   Temp (!) 35.8 °C (96.4 °F) (Temporal)   Ht 1.651 m (5' 5\")   Wt 64 kg (141 lb 1.5 oz)   LMP 09/10/2007   SpO2 100%   BMI 23.48 kg/m²     Labs: Reviewed    Assessment:                                                     Plan:    1. Left hand pain  Patient is to wait approximately 2 weeks of continued pain tracy-ray left hand to rule out possible occult fracture  - DX-HAND 3+ LEFT; Future    2. Thoracic back pain, unspecified back pain laterality, unspecified chronicity  Patient with continued back pain continues to work limited to 7 to 9 hours/day because of previous chronic T10 fracture      Working 7 to 9 hours / day   "

## 2022-10-09 ENCOUNTER — PATIENT MESSAGE (OUTPATIENT)
Dept: MEDICAL GROUP | Facility: LAB | Age: 62
End: 2022-10-09
Payer: COMMERCIAL

## 2022-10-09 DIAGNOSIS — R92.30 DENSE BREAST TISSUE: ICD-10-CM

## 2022-10-10 ENCOUNTER — PATIENT MESSAGE (OUTPATIENT)
Dept: MEDICAL GROUP | Facility: LAB | Age: 62
End: 2022-10-10
Payer: COMMERCIAL

## 2022-10-10 ENCOUNTER — HOSPITAL ENCOUNTER (OUTPATIENT)
Dept: RADIOLOGY | Facility: MEDICAL CENTER | Age: 62
End: 2022-10-10
Attending: INTERNAL MEDICINE
Payer: COMMERCIAL

## 2022-10-10 DIAGNOSIS — Z12.31 VISIT FOR SCREENING MAMMOGRAM: ICD-10-CM

## 2022-10-10 PROCEDURE — 77063 BREAST TOMOSYNTHESIS BI: CPT

## 2022-10-14 ENCOUNTER — HOSPITAL ENCOUNTER (OUTPATIENT)
Dept: LAB | Facility: MEDICAL CENTER | Age: 62
End: 2022-10-14
Attending: INTERNAL MEDICINE
Payer: COMMERCIAL

## 2022-10-14 DIAGNOSIS — R74.8 ELEVATED ALKALINE PHOSPHATASE LEVEL: ICD-10-CM

## 2022-10-14 LAB
GGT SERPL-CCNC: 25 U/L (ref 7–34)
HBA1C MFR BLD: 5.9 % (ref ?–5.8)

## 2022-10-14 PROCEDURE — 82977 ASSAY OF GGT: CPT

## 2022-10-14 PROCEDURE — 84080 ASSAY ALKALINE PHOSPHATASES: CPT

## 2022-10-14 PROCEDURE — 84075 ASSAY ALKALINE PHOSPHATASE: CPT

## 2022-10-14 PROCEDURE — 36415 COLL VENOUS BLD VENIPUNCTURE: CPT

## 2022-10-17 LAB
ALP BONE SERPL-CCNC: 48 U/L (ref 0–55)
ALP ISOS SERPL HS-CCNC: 0 U/L
ALP LIVER SERPL-CCNC: 89 U/L (ref 0–94)
ALP SERPL-CCNC: 137 U/L (ref 40–120)

## 2022-12-14 ENCOUNTER — TELEPHONE (OUTPATIENT)
Dept: MEDICAL GROUP | Facility: LAB | Age: 62
End: 2022-12-14
Payer: COMMERCIAL

## 2022-12-14 NOTE — TELEPHONE ENCOUNTER
ESTABLISHED PATIENT PRE-VISIT PLANNING     Patient was NOT contacted to complete PVP.     Note: Patient will not be contacted if there is no indication to call.     1.  Reviewed notes from the last few office visits within the medical group: Yes    2.  If any orders were placed at last visit or intended to be done for this visit (i.e. 6 mos follow-up), do we have Results/Consult Notes?           Labs - Labs ordered, completed on 10/14/22 and results are in chart.  Note: If patient appointment is for lab review and patient did not complete labs, check with provider if OK to reschedule patient until labs completed.         Imaging - Imaging ordered, completed and results are in chart.         Referrals - No referrals were ordered at last office visit.    3. Is this appointment scheduled as a Hospital Follow-Up? No    4.  Immunizations were updated in Epic using Reconcile Outside Information activity? Yes    5.  Patient is due for the following Health Maintenance Topics:   Health Maintenance Due   Topic Date Due    HEPATITIS C SCREENING  Never done    IMM ZOSTER VACCINES (2 of 3) 02/04/2015    CERVICAL CANCER SCREENING  08/06/2022   6.  AHA (Pulse8) form printed for Provider? N/A

## 2023-01-24 RX ORDER — LEVOTHYROXINE SODIUM 75 MCG
TABLET ORAL
Qty: 90 TABLET | Refills: 3 | Status: SHIPPED | OUTPATIENT
Start: 2023-01-24 | End: 2023-12-18 | Stop reason: SDUPTHER

## 2023-02-02 NOTE — TELEPHONE ENCOUNTER
Caller Name: Fabiana  Call Back Number: [phone]    How would the patient prefer to be contacted with a response: Phone call OK to leave a detailed message    Fabiana called and left a message stating that she wanted to make sure that you put in the correct billing code for the Dexa scan.  She said she had an issue with a wrong billing code in the past and wanted to make sure that the $276 she is being charged for the scan is correct.  
Please let her know that he used postmenopausal state/menopause and she may want to check with [facility] if that needs to be changed to screening for osteoporosis.  
Hemanth Poe  [facility] Physician [facility]  CARDIOLOGY [address] [place] [address]  Scheduled Appointment: 04/17/2023

## 2023-02-03 ENCOUNTER — OFFICE VISIT (OUTPATIENT)
Dept: MEDICAL GROUP | Facility: LAB | Age: 63
End: 2023-02-03
Payer: COMMERCIAL

## 2023-02-03 ENCOUNTER — HOSPITAL ENCOUNTER (OUTPATIENT)
Dept: LAB | Facility: MEDICAL CENTER | Age: 63
End: 2023-02-03
Attending: INTERNAL MEDICINE
Payer: COMMERCIAL

## 2023-02-03 VITALS
BODY MASS INDEX: 23.48 KG/M2 | DIASTOLIC BLOOD PRESSURE: 74 MMHG | SYSTOLIC BLOOD PRESSURE: 116 MMHG | OXYGEN SATURATION: 98 % | HEIGHT: 65 IN | TEMPERATURE: 97.3 F | HEART RATE: 82 BPM | RESPIRATION RATE: 12 BRPM

## 2023-02-03 DIAGNOSIS — R74.8 ABNORMAL ALKALINE PHOSPHATASE TEST: ICD-10-CM

## 2023-02-03 DIAGNOSIS — Z23 NEED FOR SHINGLES VACCINE: ICD-10-CM

## 2023-02-03 DIAGNOSIS — Z11.59 NEED FOR HEPATITIS C SCREENING TEST: ICD-10-CM

## 2023-02-03 DIAGNOSIS — G89.29 CHRONIC RIGHT SHOULDER PAIN: ICD-10-CM

## 2023-02-03 DIAGNOSIS — Z00.00 ANNUAL PHYSICAL EXAM: ICD-10-CM

## 2023-02-03 DIAGNOSIS — M25.511 CHRONIC RIGHT SHOULDER PAIN: ICD-10-CM

## 2023-02-03 PROCEDURE — 99214 OFFICE O/P EST MOD 30 MIN: CPT | Performed by: INTERNAL MEDICINE

## 2023-02-03 RX ORDER — MELOXICAM 15 MG/1
TABLET ORAL
COMMUNITY
End: 2023-02-03

## 2023-02-03 ASSESSMENT — PATIENT HEALTH QUESTIONNAIRE - PHQ9: CLINICAL INTERPRETATION OF PHQ2 SCORE: 0

## 2023-02-03 NOTE — PROGRESS NOTES
CC: Fabiana Flores is a 63 y.o. female is suffering from   Chief Complaint   Patient presents with    Annual Exam         SUBJECTIVE:  1. Annual physical exam  Patient is here for follow-up, we have discussed multiple issues today including an elevated alkaline phosphatase test.    2. Abnormal alkaline phosphatase test  Patient and I have reviewed her historical records, there appears to be a low level elevation, we have discussed possibly being related to her history of osteopenia.    3. Need for hepatitis C screening test  Patient is in need of hepatitis C screening test    4. Need for shingles vaccine  Scription has been written for shingles vaccination    5. Chronic right shoulder pain  Patient with a history of chronic right shoulder pain undergoing physical therapy a prescription        Past social, family, history: , Tavia  Social History     Tobacco Use    Smoking status: Never    Smokeless tobacco: Never   Vaping Use    Vaping Use: Never used   Substance Use Topics    Alcohol use: No    Drug use: No         MEDICATIONS:    Current Outpatient Medications:     NON SPECIFIED, Please vaccinate with Shingrix vaccine, Disp: 1 Each, Rfl: 1    SYNTHROID 75 MCG Tab, TAKE ONE TABLET BY MOUTH EVERY MORNING ON AN EMPTY STOMACH, Disp: 90 Tablet, Rfl: 3    acyclovir (ZOVIRAX) 5 % Ointment, Apply 1 Application topically every 3 hours., Disp: 1 Each, Rfl: 0    methocarbamol (ROBAXIN) 500 MG Tab, Take 2 Tablets by mouth 3 times a day., Disp: 60 Tablet, Rfl: 1    celecoxib (CELEBREX) 100 MG Cap, Take 1 capsule by mouth 2 times a day., Disp: 60 capsule, Rfl: 5    vitamin D (CHOLECALCIFEROL) 1000 UNIT Tab, Take 1,000 Units by mouth every day., Disp: , Rfl:     Calcium Carb-Cholecalciferol 600-800 MG-UNIT Tab, Take 2 tablet by mouth every day., Disp: 60 Tab, Rfl: 11    PROBLEMS:  Patient Active Problem List    Diagnosis Date Noted    Other osteoporosis without current pathological fracture 11/23/2019    Dyslipidemia  "11/30/2018    Closed wedge compression fracture of T10 vertebra (HCC) 08/29/2017    Migraine with aura and without status migrainosus, not intractable 03/10/2017    Acquired hypothyroidism 10/13/2016    Vaginal atrophy 10/13/2016       REVIEW OF SYSTEMS:  Gen.:  No Nausea, Vomiting, fever, Chills.  Heart: No chest pain.  Lungs:  No shortness of Breath.  Psychological: Collin unusual Anxiety depression     PHYSICAL EXAM   Constitutional: Alert, cooperative, not in acute distress.  Cardiovascular:  Rate Rhythm is regular without murmurs rubs clicks.     Thorax & Lungs: Clear to auscultation, no wheezing, rhonchi, or rales  HENT: Normocephalic, Atraumatic.  Eyes: PERRLA, EOMI, Conjunctiva normal.   Neck: Trachia is midline no swelling of the thyroid.   Lymphatic: No lymphadenopathy noted.   Abdomin: Soft non-tender, no rebound, no guarding.   Skin: Warm, Dry, No erythema, No rash.   Extremities: Atraumatic with symmetric distal pulses, No edema, No tenderness, No cyanosis, No clubbing.   Musculoskeletal: Loss of range of motion in external, internal rotation also adduction continue physical therapy  Neurologic: Alert & oriented x 3, cranial nerves II through XII are intact, Normal motor function, Normal sensory function, No focal deficits noted.   Psychiatric: Affect normal, Judgment normal, Mood normal.     VITAL SIGNS:/74 (BP Location: Left arm, Patient Position: Sitting, BP Cuff Size: Adult)   Pulse 82   Temp 36.3 °C (97.3 °F)   Resp 12   Ht 1.651 m (5' 5\")   LMP 09/10/2007   SpO2 98%   BMI 23.48 kg/m²     Labs: Reviewed    Assessment:                                                     Plan:    1. Annual physical exam  Patient appears to be overall in very good physical condition    2. Abnormal alkaline phosphatase test  PTH PTH related peptide ordered continue vitamin D at 2000 international units each day  - PTH INTACT (PTH ONLY); Future  - PTH RELATED PEPTIDE; Future    3. Need for hepatitis C " screening test  Check hepatitis C  - HEP C VIRUS ANTIBODY; Future    4. Need for shingles vaccine  Prescription written  - NON SPECIFIED; Please vaccinate with Shingrix vaccine  Dispense: 1 Each; Refill: 1    5. Chronic right shoulder pain  Encourage patient undergo x-ray, continue with physical therapy  - DX-SHOULDER 2+ RIGHT; Future

## 2023-02-09 ENCOUNTER — PATIENT MESSAGE (OUTPATIENT)
Dept: MEDICAL GROUP | Facility: LAB | Age: 63
End: 2023-02-09
Payer: COMMERCIAL

## 2023-02-09 DIAGNOSIS — N39.0 URINARY TRACT INFECTION WITHOUT HEMATURIA, SITE UNSPECIFIED: ICD-10-CM

## 2023-02-10 ENCOUNTER — HOSPITAL ENCOUNTER (OUTPATIENT)
Dept: LAB | Facility: MEDICAL CENTER | Age: 63
End: 2023-02-10
Attending: INTERNAL MEDICINE
Payer: COMMERCIAL

## 2023-02-10 ENCOUNTER — PATIENT MESSAGE (OUTPATIENT)
Dept: MEDICAL GROUP | Facility: LAB | Age: 63
End: 2023-02-10
Payer: COMMERCIAL

## 2023-02-10 ENCOUNTER — HOSPITAL ENCOUNTER (OUTPATIENT)
Dept: RADIOLOGY | Facility: MEDICAL CENTER | Age: 63
End: 2023-02-10
Attending: INTERNAL MEDICINE
Payer: COMMERCIAL

## 2023-02-10 DIAGNOSIS — G89.29 CHRONIC RIGHT SHOULDER PAIN: ICD-10-CM

## 2023-02-10 DIAGNOSIS — R74.8 ABNORMAL ALKALINE PHOSPHATASE TEST: ICD-10-CM

## 2023-02-10 DIAGNOSIS — Z11.59 NEED FOR HEPATITIS C SCREENING TEST: ICD-10-CM

## 2023-02-10 DIAGNOSIS — M79.642 LEFT HAND PAIN: ICD-10-CM

## 2023-02-10 DIAGNOSIS — N39.0 URINARY TRACT INFECTION WITHOUT HEMATURIA, SITE UNSPECIFIED: ICD-10-CM

## 2023-02-10 DIAGNOSIS — M25.511 CHRONIC RIGHT SHOULDER PAIN: ICD-10-CM

## 2023-02-10 LAB
APPEARANCE UR: CLEAR
BILIRUB UR QL STRIP.AUTO: NEGATIVE
COLOR UR: YELLOW
GLUCOSE UR STRIP.AUTO-MCNC: NEGATIVE MG/DL
HYALINE CASTS #/AREA URNS LPF: ABNORMAL /LPF
KETONES UR STRIP.AUTO-MCNC: NEGATIVE MG/DL
LEUKOCYTE ESTERASE UR QL STRIP.AUTO: NEGATIVE
MICRO URNS: ABNORMAL
NITRITE UR QL STRIP.AUTO: NEGATIVE
PH UR STRIP.AUTO: 5.5 [PH] (ref 5–8)
PROT UR QL STRIP: NEGATIVE MG/DL
RBC # URNS HPF: ABNORMAL /HPF
RBC UR QL AUTO: ABNORMAL
SP GR UR STRIP.AUTO: 1.02
UROBILINOGEN UR STRIP.AUTO-MCNC: 0.2 MG/DL
WBC #/AREA URNS HPF: ABNORMAL /HPF

## 2023-02-10 PROCEDURE — 81001 URINALYSIS AUTO W/SCOPE: CPT

## 2023-02-10 PROCEDURE — 73030 X-RAY EXAM OF SHOULDER: CPT | Mod: RT

## 2023-02-10 RX ORDER — NITROFURANTOIN 25; 75 MG/1; MG/1
100 CAPSULE ORAL 2 TIMES DAILY
Qty: 10 CAPSULE | Refills: 0 | Status: SHIPPED | OUTPATIENT
Start: 2023-02-10 | End: 2023-11-06

## 2023-02-11 ENCOUNTER — HOSPITAL ENCOUNTER (OUTPATIENT)
Dept: LAB | Facility: MEDICAL CENTER | Age: 63
End: 2023-02-11
Attending: INTERNAL MEDICINE
Payer: COMMERCIAL

## 2023-02-11 LAB
HCV AB SER QL: NORMAL
PTH-INTACT SERPL-MCNC: 27.7 PG/ML (ref 14–72)

## 2023-02-11 PROCEDURE — 36415 COLL VENOUS BLD VENIPUNCTURE: CPT

## 2023-02-11 PROCEDURE — 83970 ASSAY OF PARATHORMONE: CPT

## 2023-02-11 PROCEDURE — 86803 HEPATITIS C AB TEST: CPT

## 2023-02-11 PROCEDURE — 82542 COL CHROMOTOGRAPHY QUAL/QUAN: CPT

## 2023-02-13 ENCOUNTER — PATIENT MESSAGE (OUTPATIENT)
Dept: MEDICAL GROUP | Facility: LAB | Age: 63
End: 2023-02-13
Payer: COMMERCIAL

## 2023-02-14 ENCOUNTER — TELEPHONE (OUTPATIENT)
Dept: MEDICAL GROUP | Facility: LAB | Age: 63
End: 2023-02-14
Payer: COMMERCIAL

## 2023-02-14 NOTE — TELEPHONE ENCOUNTER
Kiesha:  As long as Fabiana is negative for pain or burning with urination I would not start antibiotics.   Regards, Ray Irizarry, DO

## 2023-02-18 LAB — PTH RELATED PROT SERPL-SCNC: 3.3 PMOL/L (ref 0–3.4)

## 2023-03-27 DIAGNOSIS — Z23 NEED FOR SHINGLES VACCINE: ICD-10-CM

## 2023-09-30 ENCOUNTER — PATIENT MESSAGE (OUTPATIENT)
Dept: MEDICAL GROUP | Facility: LAB | Age: 63
End: 2023-09-30

## 2023-09-30 ENCOUNTER — TELEMEDICINE (OUTPATIENT)
Dept: TELEHEALTH | Facility: TELEMEDICINE | Age: 63
End: 2023-09-30
Payer: COMMERCIAL

## 2023-09-30 DIAGNOSIS — U07.1 COVID-19 VIRUS INFECTION: ICD-10-CM

## 2023-09-30 DIAGNOSIS — R19.7 DIARRHEA, UNSPECIFIED TYPE: ICD-10-CM

## 2023-09-30 DIAGNOSIS — R09.81 NASAL CONGESTION: ICD-10-CM

## 2023-09-30 DIAGNOSIS — R05.9 COUGH, UNSPECIFIED TYPE: ICD-10-CM

## 2023-09-30 DIAGNOSIS — J02.9 SORE THROAT: ICD-10-CM

## 2023-09-30 PROCEDURE — 99213 OFFICE O/P EST LOW 20 MIN: CPT | Mod: 95 | Performed by: PHYSICIAN ASSISTANT

## 2023-09-30 NOTE — PROGRESS NOTES
Telemedicine Visit: Established Patient     This evaluation was conducted via Zoom using secure and encrypted videoconferencing technology. The patient was in their home in the state [facility].    The patient's identity was confirmed and verbal consent was obtained for this virtual visit.    Subjective:   CC: positive covid test yesterday, sx x one day, would like paxlovid.  Fabiana Flores is a 63 y.o. female presenting for evaluation and management of COVID 19 infection.  Patient states she started having some sinus congestion, ear fullness sore throat symptoms yesterday morning, symptoms became progressively worse over the course of the day.  Patient did a COVID test last night which was positive.  Patient is also having some diarrhea which started this morning.      Patient has been taking some over-the-counter cough cold medications with no real change in her symptoms.    Patient is interested in having a Paxlovid prescription.      ROS     Current medicines (including changes today)  Medications:    acyclovir Oint  Calcium Carb-Cholecalciferol Tabs  celecoxib Caps  methocarbamol Tabs  nitrofurantoin Caps  NON SPECIFIED  Synthroid Tabs  vitamin D Tabs    Allergies: Ultram [tramadol hcl], Food, and Nkda [no known drug allergy]    Problem List: Fabiana Flores does not have any pertinent problems on file.    Surgical History:  Past Surgical History:   Procedure Laterality Date    CHOLECYSTECTOMY      OTHER             Past Social Hx: Fabiana Flores  reports that she has never smoked. She has never used smokeless tobacco. She reports that she does not drink alcohol and does not use drugs.     Past Family Hx:  Fabiana Flores family history includes Cancer (age of onset: 40) in her sister.     Problem list, medications, and allergies reviewed by myself today in Epic.     Objective:   LMP 09/10/2007      VS:Not taken due to virtual visit.    Physical Exam:  Constitutional: Alert, no distress,  well-groomed.  Skin: No rashes in visible areas.  Eye: Round. Conjunctiva clear, lids normal. No icterus.   ENMT: Lips pink without lesions, good dentition, moist mucous membranes. Phonation normal.  Neck: No masses, no thyromegaly. Moves freely without pain.  CV: Pulse as reported by patient is normal  Respiratory: Unlabored respiratory effort, occasional cough without audible wheeze, nose is congested, voice is slightly hoarse.  Psych: Alert and oriented x3, normal affect and mood.       Assessment and Plan:   The following treatment plan was discussed:     1. COVID-19 virus infection  Nirmatrelvir&Ritonavir 300/100 20 x 150 MG & 10 x 100MG Tablet Therapy Pack      2. Cough, unspecified type  Nirmatrelvir&Ritonavir 300/100 20 x 150 MG & 10 x 100MG Tablet Therapy Pack      3. Nasal congestion  Nirmatrelvir&Ritonavir 300/100 20 x 150 MG & 10 x 100MG Tablet Therapy Pack      4. Sore throat  Nirmatrelvir&Ritonavir 300/100 20 x 150 MG & 10 x 100MG Tablet Therapy Pack      5. Diarrhea, unspecified type  Nirmatrelvir&Ritonavir 300/100 20 x 150 MG & 10 x 100MG Tablet Therapy Pack          Patient HPI and physical exam though limited is consistent with COVID-19 virus infection.  Patient did have a positive home COVID test last night.  Patient is within the treatment window for with Paxlovid for COVID-19 virus.  Patient is 63 and I feel she would benefit from this treatment.  Patient is not on any medications that are contraindicated with Paxlovid use.  I have sent a prescription to [facility] in [facility] at patient's request.  Patient to begin Paxlovid today.    Patient can continue taking over-the-counter cough cold medications as desired for her symptoms.    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    Differential diagnosis, supportive care, and indications for immediate follow-up discussed with patient.  Instructed to return to clinic or nearest emergency department  for any change in condition, further concerns, or worsening of symptoms.    I personally reviewed prior external notes and test results pertinent to today's visit.  I have independently reviewed and interpreted all diagnostics ordered during this urgent care visit.    PT should follow up with PCP in 1-2 days for re-evaluation if symptoms have not improved.      Discussed red flags and reasons to return to UC or ED.      Pt and/or family verbalized understanding of diagnosis and follow up instructions and was offered informational handout on diagnosis.  PT discharged.     Please note that this dictation was created using voice recognition software. I have made every reasonable attempt to correct obvious errors, but I expect that there may be errors of grammar and possibly content that I did not discover before finalizing the note.     Follow-up: Return if symptoms worsen or fail to improve.

## 2023-09-30 NOTE — LETTER
9/30/2023               Fabiana Flores  [address] [facility] [address]        Dear Fabiana (MR#[id number]),    This letter is to inform you that your COVID-19 test result is POSITIVE.        If your symptoms are generally mild and stable, please isolate yourself at home. If it becomes difficult to breathe, contact your healthcare provider as soon as possible.    Next steps:  -  Stay home except to get medical care.  -  Follow the instructions for home isolation below.  -  Call ahead before visiting your healthcare provider or a hospital.  -  Go to the nearest emergency department for worsening symptoms including difficulty breathing, ongoing fever, weakness or chest pain.    You should isolate yourself:  -  For a minimum of 5 days from symptom onset or positive test and  -  At least 24 hours have passed since your last fever without the use of fever-reducing medications and  -  All other symptoms have improved (loss of taste and smell may persist for weeks or months after recovery and should not delay the end of isolation)    Instructions for Self-Isolation at Home:  -  We recommend you stay in your home and minimize contact with others to avoid spreading this infection.  -  Do not go to work, school or public areas unless otherwise directed by the health department. Avoid using public transportation, ridesharing or taxis.  -  Separate yourself from other people in your home as much as possible.  -  Stay in a specific room and away from other people in your home as much as possible. Use a separate bathroom if possible.        When seeking care at a healthcare facility:  -  Seek prompt medical attention if your illness is worsening (e.g., difficulty breathing).  -  When possible, call the healthcare provider before arriving.  -  Put on a facemask before you enter the facility.  -  If possible, put on a facemask before the ambulance or paramedics arrive.  -  These steps will help the healthcare provider's office  prevent other people from getting infected or exposed.      For any further questions regarding COVID-19, please contact your county's health department or healthcare provider.        Sincerely,    The [facility] Care Team

## 2023-10-02 ENCOUNTER — PATIENT MESSAGE (OUTPATIENT)
Dept: MEDICAL GROUP | Facility: LAB | Age: 63
End: 2023-10-02
Payer: COMMERCIAL

## 2023-10-27 ENCOUNTER — PATIENT MESSAGE (OUTPATIENT)
Dept: MEDICAL GROUP | Facility: LAB | Age: 63
End: 2023-10-27
Payer: COMMERCIAL

## 2023-10-27 DIAGNOSIS — R92.30 DENSE BREAST TISSUE: ICD-10-CM

## 2023-10-27 DIAGNOSIS — Z80.3 FAMILY HISTORY OF BREAST CANCER: ICD-10-CM

## 2023-11-06 ENCOUNTER — OFFICE VISIT (OUTPATIENT)
Dept: MEDICAL GROUP | Facility: MEDICAL CENTER | Age: 63
End: 2023-11-06
Payer: COMMERCIAL

## 2023-11-06 VITALS
TEMPERATURE: 98.2 F | SYSTOLIC BLOOD PRESSURE: 136 MMHG | DIASTOLIC BLOOD PRESSURE: 82 MMHG | BODY MASS INDEX: 24.24 KG/M2 | HEIGHT: 65 IN | HEART RATE: 93 BPM | WEIGHT: 145.5 LBS | OXYGEN SATURATION: 95 %

## 2023-11-06 DIAGNOSIS — J02.9 ACUTE PHARYNGITIS, UNSPECIFIED ETIOLOGY: ICD-10-CM

## 2023-11-06 DIAGNOSIS — R05.3 PERSISTENT COUGH: ICD-10-CM

## 2023-11-06 DIAGNOSIS — E03.9 ACQUIRED HYPOTHYROIDISM: ICD-10-CM

## 2023-11-06 LAB — S PYO DNA SPEC NAA+PROBE: NOT DETECTED

## 2023-11-06 PROCEDURE — 3079F DIAST BP 80-89 MM HG: CPT | Performed by: INTERNAL MEDICINE

## 2023-11-06 PROCEDURE — 87651 STREP A DNA AMP PROBE: CPT | Performed by: INTERNAL MEDICINE

## 2023-11-06 PROCEDURE — 99213 OFFICE O/P EST LOW 20 MIN: CPT | Performed by: INTERNAL MEDICINE

## 2023-11-06 PROCEDURE — 3075F SYST BP GE 130 - 139MM HG: CPT | Performed by: INTERNAL MEDICINE

## 2023-11-06 RX ORDER — BENZONATATE 100 MG/1
100 CAPSULE ORAL 3 TIMES DAILY PRN
Qty: 60 CAPSULE | Refills: 0 | Status: SHIPPED
Start: 2023-11-06 | End: 2024-01-25 | Stop reason: CLARIF

## 2023-11-06 NOTE — PROGRESS NOTES
Subjective:     CC:   Diagnoses of Persistent cough, Acute pharyngitis, unspecified etiology, and Acquired hypothyroidism were pertinent to this visit.    HPI: Fabiana is a pleasant 63 y.o. female who presents today for evaluation of the persistent cough after COVID-19 infection.    Problem   Persistent Cough    Ongoing problem, patient has tested positive for COVID-19 on September 29, 2023.  She was seen by [facility] and prescribed a course of Paxlovid.  She was not able to tolerated due to side effects.    Since then she has been experiencing ongoing sore throat, dry nonproductive cough.    Home remedies: Lozenges, lemon, salty gargles 1-2 times a day.  Does remedies make no difference.    She was recently in contact with her grandbaby, who goes to  and might have had mild respiratory infection.    POCT strep test is negative in office.  Patient was notified via [employer], that antibiotics are not indicated and she will continue symptomatic treatment as discussed during her appointment.           Acquired Hypothyroidism    Chronic problem, patient is taking Synthroid 75 mcg daily.  Tolerating well, no side effects       Past Medical History:   Diagnosis Date    Anemia     Celiac disease     Closed wedge compression fracture of T10 vertebra (HCC) 8/29/2017    Dyslipidemia 11/30/2018    GERD (gastroesophageal reflux disease)     Herpes simplex type 1 antibody positive     lips    Hypothyroid     Migraine     Osteopenia      Current Outpatient Medications Ordered in Epic   Medication Sig Dispense Refill    benzonatate (TESSALON) 100 MG Cap Take 1 Capsule by mouth 3 times a day as needed for Cough. 60 Capsule 0    acyclovir (ZOVIRAX) 5 % Ointment Apply 1 Application. topically every 3 hours. 1 Each 3    SYNTHROID 75 MCG Tab TAKE ONE TABLET BY MOUTH EVERY MORNING ON AN EMPTY STOMACH 90 Tablet 3    vitamin D (CHOLECALCIFEROL) 1000 UNIT Tab Take 1,000 Units by mouth every day.      Calcium Carb-Cholecalciferol 600-800  "MG-UNIT Tab Take 2 tablet by mouth every day. 60 Tab 11    NON SPECIFIED Please vaccinate with Shingrix vaccine. (Patient not taking: Reported on 11/6/2023) 1 Each 1    NON SPECIFIED Please vaccinate with Shingrix vaccine (Patient not taking: Reported on 11/6/2023) 1 Each 1    methocarbamol (ROBAXIN) 500 MG Tab Take 2 Tablets by mouth 3 times a day. (Patient not taking: Reported on 11/6/2023) 60 Tablet 1     No current Epic-ordered facility-administered medications on file.       ROS negative except as above      Objective:     Exam:  /82 (BP Location: Right arm, Patient Position: Sitting, BP Cuff Size: Adult)   Pulse 93   Temp 36.8 °C (98.2 °F) (Temporal)   Ht 1.651 m (5' 5\")   Wt 66 kg (145 lb 8.1 oz)   LMP 09/10/2007   SpO2 95%   BMI 24.21 kg/m²  Body mass index is 24.21 kg/m².    Physical Exam  Constitutional:       Appearance: Normal appearance.   HENT:      Head: Normocephalic and atraumatic.      Nose: Nose normal. No congestion.      Mouth/Throat:      Mouth: Mucous membranes are moist.      Pharynx: Oropharynx is clear. Posterior oropharyngeal erythema (mild) present.      Tonsils: No tonsillar exudate (2 mm tonsil stone).   Neurological:      Mental Status: She is alert.       Assessment & Plan:   Fabiana  is a pleasant 63 y.o. female with the following -     Problem List Items Addressed This Visit       Acquired hypothyroidism (Chronic)    Persistent cough    Relevant Medications    benzonatate (TESSALON) 100 MG Cap     Other Visit Diagnoses       Acute pharyngitis, unspecified etiology        Relevant Orders    POCT GROUP A STREP, PCR (Completed)          No follow-ups on file.    Please note that this dictation was created using voice recognition software. I have made every reasonable attempt to correct obvious errors, but I expect that there are errors of grammar and possibly content that I did not discover before finalizing the note.        "

## 2023-11-08 ENCOUNTER — HOSPITAL ENCOUNTER (OUTPATIENT)
Dept: RADIOLOGY | Facility: MEDICAL CENTER | Age: 63
End: 2023-11-08
Attending: INTERNAL MEDICINE
Payer: COMMERCIAL

## 2023-11-08 DIAGNOSIS — Z12.31 VISIT FOR SCREENING MAMMOGRAM: ICD-10-CM

## 2023-11-08 PROCEDURE — 77063 BREAST TOMOSYNTHESIS BI: CPT

## 2023-11-10 ENCOUNTER — APPOINTMENT (OUTPATIENT)
Dept: RADIOLOGY | Facility: MEDICAL CENTER | Age: 63
End: 2023-11-10
Attending: INTERNAL MEDICINE
Payer: COMMERCIAL

## 2023-12-18 RX ORDER — LEVOTHYROXINE SODIUM 75 MCG
TABLET ORAL
Qty: 90 TABLET | Refills: 3 | Status: SHIPPED | OUTPATIENT
Start: 2023-12-18 | End: 2024-01-26 | Stop reason: SDUPTHER

## 2023-12-20 ENCOUNTER — APPOINTMENT (OUTPATIENT)
Dept: RADIOLOGY | Facility: MEDICAL CENTER | Age: 63
End: 2023-12-20
Attending: INTERNAL MEDICINE
Payer: COMMERCIAL

## 2023-12-26 ENCOUNTER — APPOINTMENT (OUTPATIENT)
Dept: MEDICAL GROUP | Facility: LAB | Age: 63
End: 2023-12-26
Payer: COMMERCIAL

## 2024-01-25 ENCOUNTER — HOSPITAL ENCOUNTER (OUTPATIENT)
Facility: MEDICAL CENTER | Age: 64
End: 2024-01-25
Attending: OBSTETRICS & GYNECOLOGY
Payer: COMMERCIAL

## 2024-01-25 ENCOUNTER — GYNECOLOGY VISIT (OUTPATIENT)
Dept: OBGYN | Facility: CLINIC | Age: 64
End: 2024-01-25
Payer: COMMERCIAL

## 2024-01-25 VITALS
BODY MASS INDEX: 24.75 KG/M2 | SYSTOLIC BLOOD PRESSURE: 154 MMHG | HEIGHT: 64 IN | WEIGHT: 145 LBS | DIASTOLIC BLOOD PRESSURE: 75 MMHG

## 2024-01-25 DIAGNOSIS — N89.8 VAGINAL DRYNESS: ICD-10-CM

## 2024-01-25 DIAGNOSIS — Z01.419 WOMEN'S ANNUAL ROUTINE GYNECOLOGICAL EXAMINATION: Primary | ICD-10-CM

## 2024-01-25 DIAGNOSIS — Z01.419 WOMEN'S ANNUAL ROUTINE GYNECOLOGICAL EXAMINATION: ICD-10-CM

## 2024-01-25 PROCEDURE — 87624 HPV HI-RISK TYP POOLED RSLT: CPT

## 2024-01-25 PROCEDURE — 99386 PREV VISIT NEW AGE 40-64: CPT | Performed by: OBSTETRICS & GYNECOLOGY

## 2024-01-25 PROCEDURE — 3078F DIAST BP <80 MM HG: CPT | Performed by: OBSTETRICS & GYNECOLOGY

## 2024-01-25 PROCEDURE — 3077F SYST BP >= 140 MM HG: CPT | Performed by: OBSTETRICS & GYNECOLOGY

## 2024-01-25 PROCEDURE — 88175 CYTOPATH C/V AUTO FLUID REDO: CPT

## 2024-01-25 RX ORDER — ESTRADIOL 0.1 MG/G
1 CREAM VAGINAL
Qty: 42.5 G | Refills: 1 | Status: SHIPPED | OUTPATIENT
Start: 2024-01-25

## 2024-01-25 NOTE — PROGRESS NOTES
Patient here for annual exam  Last pap done/result: 8/6/19-neg  LMP: post menopausal  Last mammogram, if applicable: 11/9/23  Pharmacy verified

## 2024-01-25 NOTE — PROGRESS NOTES
ANNUAL GYNECOLOGY VISIT    HPI:  Patient is a 63 y.o.  who presents for her annual exam.  Her main concern is that she has vaginal dryness.  She has been given a prescription in the past, but she never used it.  She also does not use any over-the-counter lubrication.  She is sexually active with 1 male partner. Family history significant for breast cancer. Her sister was diagnosed in her 30s. She gets her mammograms yearly. Her daughter is a radiologist.     PREVENTIVE CARE:  Immunization History   Administered Date(s) Administered    Hep A/HEP B Combined Vaccine (TwinRix) 2012, 10/10/2012, 2013    Hepatitis B Vaccine (Adol/Adult) 1999, 1999, 2000    Influenza (IM) Preservative Free - HISTORICAL DATA 10/02/2011, 2015    Influenza Vaccine Adult HD 10/13/2019    Influenza Vaccine H1N1 - HISTORICAL DATA 2009    Influenza Vaccine Pediatric Split - Historical Data 10/14/2010, 10/02/2012    Influenza Vaccine Quad Inj (Pf) 10/07/2014, 10/01/2016, 10/17/2017, 10/21/2021, 10/30/2022    Influenza Vaccine Quad Inj (Preserved) 10/16/2013, 2018, 10/13/2019    Influenza, Unspecified - HISTORICAL DATA 10/01/2003, 10/27/2005, 2006, 10/25/2007    MODERNA BIVALENT BOOSTER SARS-COV-2 VACCINE (6+) 10/30/2022    MODERNA SARS-COV-2 VACCINE (12+) 2020, 2021, 2021, 2022    Tdap Vaccine 2019    Zoster Vaccine Live (ZVL) (Zostavax) - HISTORICAL DATA 12/10/2014     Last Mammogram: 2023  Taking Calcium/Vit D Supp: yes    Last dexa scan 2022 significant for osteopenia    CANCER RISK ASSESSMENT:  Family history of:   - Breast cancer: yes, sister had breast cancer in her 30s   - Ovarian cancer: no   - Uterine cancer: no   - Colon cancer: no    GYN HX and ROS:   Last Pap: 2019 neg    Hx Mod or Severe Dysplasia : No  Age at Menopause: in her late 40s  Sexually Active: yes    Postmenopausal bleeding: No  Sexual problems: Yes, vaginal  dryness  Significant pelvic pain: No    ROS:    Positive ROS:   General: She denies excessive fatigue, weakness, unexplained weight loss or gain, abnormal thirst, unexplained fever/chills.  Neurologic: She denies fainting spells, convulsions, dizzy spells, frequent severe headaches, depression or anxiety or vision changes.  HEENT: She denies unusual skin moles, persistent swollen glands, pain or stiff neck, goiter or lump in her neck.  Heart / Lung: She denies persistent cough, shortness of breath, severe chest pain or recurrent heart flutters.  Breast: She denies breast discharge, pain, lump or lump under her arm.  Gastrointestinal: She denies bloody stools  Urinary: She denies dysuria, hematuria, kidney or bladder infections.  Extremeties: She denies persistently swollen ankles or recurrent leg cramps.    OBSTETRIC HISTORY:  OB History    Para Term  AB Living   2 2       2   SAB IAB Ectopic Molar Multiple Live Births                    # Outcome Date GA Lbr Naveed/2nd Weight Sex Delivery Anes PTL Lv   2 Para            1 Para                MEDICAL HISTORY:  Past Medical History:   Diagnosis Date    Anemia     Celiac disease     Closed wedge compression fracture of T10 vertebra (HCC) 2017    Dyslipidemia 2018    GERD (gastroesophageal reflux disease)     Herpes simplex type 1 antibody positive     lips    Hypothyroid     Migraine     Osteopenia        MEDICATIONS:  Current Outpatient Medications on File Prior to Visit   Medication Sig Dispense Refill    SYNTHROID 75 MCG Tab TAKE ONE TABLET BY MOUTH EVERY MORNING ON AN EMPTY STOMACH 90 Tablet 3    acyclovir (ZOVIRAX) 5 % Ointment Apply 1 Application. topically every 3 hours. 1 Each 3    vitamin D (CHOLECALCIFEROL) 1000 UNIT Tab Take 1,000 Units by mouth every day.      Calcium Carb-Cholecalciferol 600-800 MG-UNIT Tab Take 2 tablet by mouth every day. 60 Tab 11    benzonatate (TESSALON) 100 MG Cap Take 1 Capsule by mouth 3 times a day as needed  for Cough. (Patient not taking: Reported on 2024) 60 Capsule 0    NON SPECIFIED Please vaccinate with Shingrix vaccine. (Patient not taking: Reported on 2023) 1 Each 1    NON SPECIFIED Please vaccinate with Shingrix vaccine (Patient not taking: Reported on 2023) 1 Each 1    methocarbamol (ROBAXIN) 500 MG Tab Take 2 Tablets by mouth 3 times a day. (Patient not taking: Reported on 2023) 60 Tablet 1     No current facility-administered medications on file prior to visit.       ALLERGIES / REACTIONS:  Allergies   Allergen Reactions    Ultram [Tramadol Hcl] Vomiting    Food      Celiac    Nkda [No Known Drug Allergy]      Gluten allergy       FAMILY HISTORY:  Family History   Problem Relation Age of Onset    Cancer Sister 40        breast       SURGICAL HISTORY:  Past Surgical History:   Procedure Laterality Date    CHOLECYSTECTOMY      OTHER             SOCIAL HISTORY:  Social History     Socioeconomic History    Marital status:      Spouse name: Not on file    Number of children: Not on file    Years of education: Not on file    Highest education level: Not on file   Occupational History    Not on file   Tobacco Use    Smoking status: Never    Smokeless tobacco: Never   Vaping Use    Vaping Use: Never used   Substance and Sexual Activity    Alcohol use: No    Drug use: No    Sexual activity: Yes     Partners: Male     Birth control/protection: Post-Menopausal   Other Topics Concern    Not on file   Social History Narrative    Not on file     Social Determinants of Health     Financial Resource Strain: Not on file   Food Insecurity: Not on file   Transportation Needs: Not on file   Physical Activity: Not on file   Stress: Not on file   Social Connections: Not on file   Intimate Partner Violence: Not on file   Housing Stability: Not on file         PHYSICAL EXAMINATION:  Vital Signs:   Vitals:    24 1517   BP: (!) 154/75   BP Location: Right arm   Patient Position: Sitting   BP  "Cuff Size: Adult   Weight: 145 lb   Height: 5' 4\"     Body mass index is 24.89 kg/m².  Constitutional: The patient is well developed and well nourished.  Psychiatric: Patient is oriented to time place and person.   Skin: No rash observed.  Neck: Appears symmetric.  Lungs: Non labored breathing  Breast: Inspection reveals no asymetry or nipple discharge, no skin thickening, dimpling or erythema.  Palpation demonstrates no masses.  Abdomen: Soft, non-tender.  Pelvic Exam:     Vulva: External female genitalia within normal limits. No lesions    Urethra - no lesions, no erythema    Vagina: moist, pale pink, decreased ruggae    Cervix: pink, smooth, no lesions, no CMT    Uterus - non-tender, normal size, shape, contour, mobile    Ovaries: non-tender, no appreciable masses  Pap Smear Performed: {Yes  Extremeties: Legs are symmetric and without tenderness. There is no edema present.    LABS:  No results found for: \"TSH\"  HDL (mg/dL)   Date Value   2019 64     LDL (mg/dL)   Date Value   2019 87       ASSESSMENT AND PLAN:  63 y.o. .here for annual exam    1. Women's annual routine gynecological examination  - THINPREP PAP WITH HPV; Future  - DS-BONE DENSITY STUDY (DEXA); Future  - MA-SCREENING MAMMO BILAT W/TOMOSYNTHESIS W/CAD; Future  - US-SCREENING WHOLE BREAST BILATERAL (3D SCREENING); Future    -Breast and pelvic exam completed  -Pap: collected   -Mammogram: 2023 neg  -Colonoscopy: 2 years ago and negative  -Advised on breast self awareness  -Advised on exercising and healthy diet. She is currently taking calcium and vitamin D. Repeat dexa scan ordered. Previously significant for osteopenia.     2. Vaginal dryness  - I discussed over the counter options. She desires to try these first. If these do not work, she would like to try a prescription option. Prescriptions discussed including a vaginal cream, tablet, or ring. She would rather try a local option first before trying an oral medication. She " desires to try Estace cream. Prescription sent to her pharmacy.   - estradiol (ESTRACE) 0.1 MG/GM vaginal cream; Insert 1 g into the vagina at bedtime. Take everynight for 2 weeks, every other night for 2 weeks, then once or twice a week as needed.  Dispense: 42.5 g; Refill: 1    3. Family history of breast cancer  - I discussed referral to a genetic counselor or the [employer]. She plans to sign up for the [employer].     4. Elevated blood pressure  - Blood pressure 154/75. Recommend follow up with PCP    Follow up: PRN or annually.     Kayla Graves M.D.

## 2024-01-26 ENCOUNTER — PATIENT MESSAGE (OUTPATIENT)
Dept: MEDICAL GROUP | Facility: LAB | Age: 64
End: 2024-01-26
Payer: COMMERCIAL

## 2024-01-26 DIAGNOSIS — E03.9 HYPOTHYROIDISM, UNSPECIFIED TYPE: ICD-10-CM

## 2024-01-26 RX ORDER — LEVOTHYROXINE SODIUM 75 MCG
TABLET ORAL
Qty: 7 TABLET | Refills: 0 | Status: SHIPPED | OUTPATIENT
Start: 2024-01-26

## 2024-01-31 LAB
CYTOLOGIST CVX/VAG CYTO: NORMAL
CYTOLOGY CVX/VAG DOC CYTO: NORMAL
CYTOLOGY CVX/VAG DOC THIN PREP: NORMAL
HPV I/H RISK 4 DNA CVX QL PROBE+SIG AMP: NEGATIVE
NOTE NL11727A: NORMAL
OTHER STN SPEC: NORMAL
STAT OF ADQ CVX/VAG CYTO-IMP: NORMAL

## 2024-02-02 ENCOUNTER — OFFICE VISIT (OUTPATIENT)
Dept: MEDICAL GROUP | Facility: LAB | Age: 64
End: 2024-02-02
Payer: COMMERCIAL

## 2024-02-02 VITALS
RESPIRATION RATE: 14 BRPM | OXYGEN SATURATION: 99 % | HEIGHT: 64 IN | SYSTOLIC BLOOD PRESSURE: 110 MMHG | WEIGHT: 143 LBS | BODY MASS INDEX: 24.41 KG/M2 | DIASTOLIC BLOOD PRESSURE: 74 MMHG | HEART RATE: 83 BPM | TEMPERATURE: 98.6 F

## 2024-02-02 DIAGNOSIS — E78.5 DYSLIPIDEMIA: ICD-10-CM

## 2024-02-02 DIAGNOSIS — K21.00 GASTROESOPHAGEAL REFLUX DISEASE WITH ESOPHAGITIS WITHOUT HEMORRHAGE: ICD-10-CM

## 2024-02-02 PROCEDURE — 3074F SYST BP LT 130 MM HG: CPT | Performed by: INTERNAL MEDICINE

## 2024-02-02 PROCEDURE — 3078F DIAST BP <80 MM HG: CPT | Performed by: INTERNAL MEDICINE

## 2024-02-02 PROCEDURE — 99213 OFFICE O/P EST LOW 20 MIN: CPT | Performed by: INTERNAL MEDICINE

## 2024-02-02 RX ORDER — SUCRALFATE 1 G/1
1 TABLET ORAL
Qty: 120 TABLET | Refills: 3 | Status: SHIPPED | OUTPATIENT
Start: 2024-02-02

## 2024-02-02 RX ORDER — SUCRALFATE 1 G/1
1 TABLET ORAL
Qty: 120 TABLET | Refills: 3 | Status: SHIPPED
Start: 2024-02-02 | End: 2024-02-02

## 2024-02-02 RX ORDER — OMEPRAZOLE 20 MG/1
20 CAPSULE, DELAYED RELEASE ORAL 2 TIMES DAILY
Qty: 40 CAPSULE | Refills: 0 | Status: SHIPPED | OUTPATIENT
Start: 2024-02-02

## 2024-02-03 NOTE — PROGRESS NOTES
CC: Fabiana Flores is a 64 y.o. female is suffering from   Chief Complaint   Patient presents with    Follow-Up         SUBJECTIVE:  1. Gastroesophageal reflux disease with esophagitis without hemorrhage  Fabiana is here for follow-up, has had a recurrent episode of severe esophagitis, patient relates that apparently 15 years ago she was treated with proton pump inhibitors also with sucralfate with good results.  Patient was seen by Dr. Liu    2. Dyslipidemia  History of dyslipidemia recheck CT cardiac scoring        Past social, family, history: , supportive   Social History     Tobacco Use    Smoking status: Never    Smokeless tobacco: Never   Vaping Use    Vaping Use: Never used   Substance Use Topics    Alcohol use: No    Drug use: No         MEDICATIONS:    Current Outpatient Medications:     omeprazole (PRILOSEC) 20 MG delayed-release capsule, Take 1 Capsule by mouth 2 times a day., Disp: 40 Capsule, Rfl: 0    sucralfate (CARAFATE) 1 GM Tab, Take 1 Tablet by mouth 4 Times a Day,Before Meals and at Bedtime., Disp: 120 Tablet, Rfl: 3    SYNTHROID 75 MCG Tab, TAKE ONE TABLET BY MOUTH EVERY MORNING ON AN EMPTY STOMACH, Disp: 7 Tablet, Rfl: 0    estradiol (ESTRACE) 0.1 MG/GM vaginal cream, Insert 1 g into the vagina at bedtime. Take everynight for 2 weeks, every other night for 2 weeks, then once or twice a week as needed., Disp: 42.5 g, Rfl: 1    acyclovir (ZOVIRAX) 5 % Ointment, Apply 1 Application. topically every 3 hours., Disp: 1 Each, Rfl: 3    vitamin D (CHOLECALCIFEROL) 1000 UNIT Tab, Take 1,000 Units by mouth every day., Disp: , Rfl:     Calcium Carb-Cholecalciferol 600-800 MG-UNIT Tab, Take 2 tablet by mouth every day., Disp: 60 Tab, Rfl: 11    PROBLEMS:  Patient Active Problem List    Diagnosis Date Noted    Persistent cough 11/06/2023    Other osteoporosis without current pathological fracture 11/23/2019    Dyslipidemia 11/30/2018    Closed wedge compression fracture of T10 vertebra (HCC)  "08/29/2017    Migraine with aura and without status migrainosus, not intractable 03/10/2017    Acquired hypothyroidism 10/13/2016    Vaginal atrophy 10/13/2016       REVIEW OF SYSTEMS:  Gen.:  No Nausea, Vomiting, fever, Chills.  Heart: No chest pain.  Lungs:  No shortness of Breath.  Psychological: Collin unusual Anxiety depression     PHYSICAL EXAM   Constitutional: Alert, cooperative, not in acute distress.  Cardiovascular:  Rate Rhythm is regular without murmurs rubs clicks.     Thorax & Lungs: Clear to auscultation, no wheezing, rhonchi, or rales  HENT: Normocephalic, Atraumatic.  Eyes: PERRLA, EOMI, Conjunctiva normal.   Neck: Trachia is midline no swelling of the thyroid.   Lymphatic: No lymphadenopathy noted.   Neurologic: Alert & oriented x 3, cranial nerves II through XII are intact, Normal motor function, Normal sensory function, No focal deficits noted.   Psychiatric: Affect normal, Judgment normal, Mood normal.     VITAL SIGNS:/74 (BP Location: Left arm, Patient Position: Sitting, BP Cuff Size: Adult)   Pulse 83   Temp 37 °C (98.6 °F)   Resp 14   Ht 1.626 m (5' 4\")   Wt 64.9 kg (143 lb)   LMP 09/10/2007   SpO2 99%   BMI 24.55 kg/m²     Labs: Reviewed    Assessment:                                                     Plan:    1. Gastroesophageal reflux disease with esophagitis without hemorrhage  Patient is to start omeprazole 20 mg twice daily for at least 2 weeks patient may use super fate 1 tablet  - H. PYLORI BREATH TEST  - omeprazole (PRILOSEC) 20 MG delayed-release capsule; Take 1 Capsule by mouth 2 times a day.  Dispense: 40 Capsule; Refill: 0  - sucralfate (CARAFATE) 1 GM Tab; Take 1 Tablet by mouth 4 Times a Day,Before Meals and at Bedtime.  Dispense: 120 Tablet; Refill: 3    2. Dyslipidemia  Recheck CT cardiac scoring  - CT-CARDIAC SCORING; Future        "

## 2024-03-26 ENCOUNTER — HOSPITAL ENCOUNTER (OUTPATIENT)
Dept: RADIOLOGY | Facility: MEDICAL CENTER | Age: 64
End: 2024-03-26
Attending: INTERNAL MEDICINE
Payer: COMMERCIAL

## 2024-03-26 ENCOUNTER — HOSPITAL ENCOUNTER (OUTPATIENT)
Dept: RADIOLOGY | Facility: MEDICAL CENTER | Age: 64
End: 2024-03-26
Attending: OBSTETRICS & GYNECOLOGY
Payer: COMMERCIAL

## 2024-03-26 DIAGNOSIS — E78.5 DYSLIPIDEMIA: ICD-10-CM

## 2024-03-26 DIAGNOSIS — Z01.419 WOMEN'S ANNUAL ROUTINE GYNECOLOGICAL EXAMINATION: ICD-10-CM

## 2024-03-26 PROCEDURE — 77080 DXA BONE DENSITY AXIAL: CPT

## 2024-03-26 PROCEDURE — 4410556 CT-CARDIAC SCORING (SELF PAY ONLY)

## 2024-03-27 ENCOUNTER — TELEPHONE (OUTPATIENT)
Dept: OBGYN | Facility: CLINIC | Age: 64
End: 2024-03-27
Payer: COMMERCIAL

## 2024-03-27 NOTE — TELEPHONE ENCOUNTER
----- Message from Kayla Graves M.D. sent at 3/26/2024  4:08 PM PDT -----  She still has osteopenia, but no osteoporosis. I see she has GERD. This is a contraindication for a lot medications that we use to treat low bone density. I recommend follow up with PCP to discuss if treatment is recommended. Continue vitamin D/calcium. Continue weight bearing exercises.  Thank you.    3/27/2024 1107  Left message for pt to call back regarding bone density results.     3/28/2024 1600  Called pt and notified as above. Pt verbalized understanding.   She stated that she just has episodes of GERD, it's not all the time. She will f/u with her PCP and see what he recommends.

## 2024-11-04 ENCOUNTER — TELEPHONE (OUTPATIENT)
Dept: MEDICAL GROUP | Facility: LAB | Age: 64
End: 2024-11-04
Payer: COMMERCIAL

## 2024-11-04 SDOH — ECONOMIC STABILITY: TRANSPORTATION INSECURITY
IN THE PAST 12 MONTHS, HAS THE LACK OF TRANSPORTATION KEPT YOU FROM MEDICAL APPOINTMENTS OR FROM GETTING MEDICATIONS?: NO

## 2024-11-04 SDOH — ECONOMIC STABILITY: FOOD INSECURITY: WITHIN THE PAST 12 MONTHS, THE FOOD YOU BOUGHT JUST DIDN'T LAST AND YOU DIDN'T HAVE MONEY TO GET MORE.: NEVER TRUE

## 2024-11-04 SDOH — ECONOMIC STABILITY: TRANSPORTATION INSECURITY
IN THE PAST 12 MONTHS, HAS LACK OF RELIABLE TRANSPORTATION KEPT YOU FROM MEDICAL APPOINTMENTS, MEETINGS, WORK OR FROM GETTING THINGS NEEDED FOR DAILY LIVING?: NO

## 2024-11-04 SDOH — HEALTH STABILITY: PHYSICAL HEALTH: ON AVERAGE, HOW MANY MINUTES DO YOU ENGAGE IN EXERCISE AT THIS LEVEL?: 40 MIN

## 2024-11-04 SDOH — ECONOMIC STABILITY: INCOME INSECURITY: HOW HARD IS IT FOR YOU TO PAY FOR THE VERY BASICS LIKE FOOD, HOUSING, MEDICAL CARE, AND HEATING?: NOT HARD AT ALL

## 2024-11-04 SDOH — ECONOMIC STABILITY: INCOME INSECURITY: IN THE LAST 12 MONTHS, WAS THERE A TIME WHEN YOU WERE NOT ABLE TO PAY THE MORTGAGE OR RENT ON TIME?: NO

## 2024-11-04 SDOH — HEALTH STABILITY: PHYSICAL HEALTH: ON AVERAGE, HOW MANY DAYS PER WEEK DO YOU ENGAGE IN MODERATE TO STRENUOUS EXERCISE (LIKE A BRISK WALK)?: 6 DAYS

## 2024-11-04 SDOH — ECONOMIC STABILITY: FOOD INSECURITY: WITHIN THE PAST 12 MONTHS, YOU WORRIED THAT YOUR FOOD WOULD RUN OUT BEFORE YOU GOT MONEY TO BUY MORE.: NEVER TRUE

## 2024-11-04 SDOH — ECONOMIC STABILITY: TRANSPORTATION INSECURITY
IN THE PAST 12 MONTHS, HAS LACK OF TRANSPORTATION KEPT YOU FROM MEETINGS, WORK, OR FROM GETTING THINGS NEEDED FOR DAILY LIVING?: NO

## 2024-11-04 SDOH — ECONOMIC STABILITY: HOUSING INSECURITY
IN THE LAST 12 MONTHS, WAS THERE A TIME WHEN YOU DID NOT HAVE A STEADY PLACE TO SLEEP OR SLEPT IN A SHELTER (INCLUDING NOW)?: NO

## 2024-11-04 SDOH — HEALTH STABILITY: MENTAL HEALTH
STRESS IS WHEN SOMEONE FEELS TENSE, NERVOUS, ANXIOUS, OR CAN'T SLEEP AT NIGHT BECAUSE THEIR MIND IS TROUBLED. HOW STRESSED ARE YOU?: TO SOME EXTENT

## 2024-11-04 ASSESSMENT — SOCIAL DETERMINANTS OF HEALTH (SDOH)
HOW OFTEN DO YOU ATTEND CHURCH OR RELIGIOUS SERVICES?: 1 TO 4 TIMES PER YEAR
HOW OFTEN DO YOU ATTENT MEETINGS OF THE CLUB OR ORGANIZATION YOU BELONG TO?: 1 TO 4 TIMES PER YEAR
HOW OFTEN DO YOU GET TOGETHER WITH FRIENDS OR RELATIVES?: PATIENT DECLINED
WITHIN THE PAST 12 MONTHS, YOU WORRIED THAT YOUR FOOD WOULD RUN OUT BEFORE YOU GOT THE MONEY TO BUY MORE: NEVER TRUE
HOW OFTEN DO YOU GET TOGETHER WITH FRIENDS OR RELATIVES?: PATIENT DECLINED
DO YOU BELONG TO ANY CLUBS OR ORGANIZATIONS SUCH AS CHURCH GROUPS UNIONS, FRATERNAL OR ATHLETIC GROUPS, OR SCHOOL GROUPS?: NO
HOW HARD IS IT FOR YOU TO PAY FOR THE VERY BASICS LIKE FOOD, HOUSING, MEDICAL CARE, AND HEATING?: NOT HARD AT ALL
IN A TYPICAL WEEK, HOW MANY TIMES DO YOU TALK ON THE PHONE WITH FAMILY, FRIENDS, OR NEIGHBORS?: MORE THAN THREE TIMES A WEEK
HOW OFTEN DO YOU ATTENT MEETINGS OF THE CLUB OR ORGANIZATION YOU BELONG TO?: 1 TO 4 TIMES PER YEAR
DO YOU BELONG TO ANY CLUBS OR ORGANIZATIONS SUCH AS CHURCH GROUPS UNIONS, FRATERNAL OR ATHLETIC GROUPS, OR SCHOOL GROUPS?: NO
HOW MANY DRINKS CONTAINING ALCOHOL DO YOU HAVE ON A TYPICAL DAY WHEN YOU ARE DRINKING: PATIENT DOES NOT DRINK
HOW OFTEN DO YOU HAVE A DRINK CONTAINING ALCOHOL: NEVER
IN A TYPICAL WEEK, HOW MANY TIMES DO YOU TALK ON THE PHONE WITH FAMILY, FRIENDS, OR NEIGHBORS?: MORE THAN THREE TIMES A WEEK
IN THE PAST 12 MONTHS, HAS THE ELECTRIC, GAS, OIL, OR WATER COMPANY THREATENED TO SHUT OFF SERVICE IN YOUR HOME?: NO
HOW OFTEN DO YOU HAVE SIX OR MORE DRINKS ON ONE OCCASION: NEVER
HOW OFTEN DO YOU ATTEND CHURCH OR RELIGIOUS SERVICES?: 1 TO 4 TIMES PER YEAR

## 2024-11-04 ASSESSMENT — LIFESTYLE VARIABLES
HOW MANY STANDARD DRINKS CONTAINING ALCOHOL DO YOU HAVE ON A TYPICAL DAY: PATIENT DOES NOT DRINK
SKIP TO QUESTIONS 9-10: 1
HOW OFTEN DO YOU HAVE A DRINK CONTAINING ALCOHOL: NEVER
AUDIT-C TOTAL SCORE: 0
HOW OFTEN DO YOU HAVE SIX OR MORE DRINKS ON ONE OCCASION: NEVER

## 2024-11-04 NOTE — TELEPHONE ENCOUNTER
Patient called stating that she had labs completed at [facility] on 10/11/24 and she would like us to request the results so that they can be viewed for her appt. Tomorrow, 11/5 w/ Dr. Irizarry.    Any questions, please call    #[phone] Patient's contact #

## 2024-11-05 ENCOUNTER — OFFICE VISIT (OUTPATIENT)
Dept: MEDICAL GROUP | Facility: LAB | Age: 64
End: 2024-11-05
Payer: COMMERCIAL

## 2024-11-05 VITALS
WEIGHT: 140 LBS | TEMPERATURE: 97.4 F | OXYGEN SATURATION: 98 % | HEART RATE: 74 BPM | HEIGHT: 64 IN | BODY MASS INDEX: 23.9 KG/M2 | RESPIRATION RATE: 16 BRPM | SYSTOLIC BLOOD PRESSURE: 120 MMHG | DIASTOLIC BLOOD PRESSURE: 80 MMHG

## 2024-11-05 DIAGNOSIS — R79.89 ABNORMAL CBC: ICD-10-CM

## 2024-11-05 DIAGNOSIS — E03.9 HYPOTHYROIDISM, UNSPECIFIED TYPE: ICD-10-CM

## 2024-11-05 PROCEDURE — 3078F DIAST BP <80 MM HG: CPT | Performed by: INTERNAL MEDICINE

## 2024-11-05 PROCEDURE — 99213 OFFICE O/P EST LOW 20 MIN: CPT | Performed by: INTERNAL MEDICINE

## 2024-11-05 PROCEDURE — 3074F SYST BP LT 130 MM HG: CPT | Performed by: INTERNAL MEDICINE

## 2024-11-05 RX ORDER — LEVOTHYROXINE SODIUM 75 MCG
TABLET ORAL
Qty: 90 TABLET | Refills: 3 | Status: SHIPPED | OUTPATIENT
Start: 2024-11-05 | End: 2024-11-08 | Stop reason: SDUPTHER

## 2024-11-05 ASSESSMENT — PATIENT HEALTH QUESTIONNAIRE - PHQ9: CLINICAL INTERPRETATION OF PHQ2 SCORE: 0

## 2024-11-06 ENCOUNTER — TELEPHONE (OUTPATIENT)
Dept: MEDICAL GROUP | Facility: LAB | Age: 64
End: 2024-11-06
Payer: COMMERCIAL

## 2024-11-06 DIAGNOSIS — E03.9 HYPOTHYROIDISM, UNSPECIFIED TYPE: ICD-10-CM

## 2024-11-06 RX ORDER — LEVOTHYROXINE SODIUM 75 UG/1
75 TABLET ORAL
Qty: 90 TABLET | Refills: 3 | Status: SHIPPED | OUTPATIENT
Start: 2024-11-06

## 2024-11-06 NOTE — PROGRESS NOTES
CC: Fabiana Flores is a 64 y.o. female is suffering from   Chief Complaint   Patient presents with    Annual Exam    Lab Results     Done 10/11           SUBJECTIVE:  1. Hypothyroidism, unspecified type  Fabiana is here for follow-up has a history of hypothyroidism, we have also discussed her most recent labs which were done through [facility].  Patient and I have discussed that she has a slightly elevated red blood cell count I recommend rechecking this in 6 months, patient has a historically elevated alkaline phosphatase, vitamin D has been acceptable PTH has been acceptable isoenzymes are unremarkable.        Past social, family, history: Reviewed, Bobby  Social History     Tobacco Use    Smoking status: Never    Smokeless tobacco: Never   Vaping Use    Vaping status: Never Used   Substance Use Topics    Alcohol use: No    Drug use: No         MEDICATIONS:    Current Outpatient Medications:     SYNTHROID 75 MCG Tab, TAKE ONE TABLET BY MOUTH EVERY MORNING ON AN EMPTY STOMACH, Disp: 90 Tablet, Rfl: 3    omeprazole (PRILOSEC) 20 MG delayed-release capsule, Take 1 Capsule by mouth 2 times a day., Disp: 40 Capsule, Rfl: 0    sucralfate (CARAFATE) 1 GM Tab, Take 1 Tablet by mouth 4 Times a Day,Before Meals and at Bedtime., Disp: 120 Tablet, Rfl: 3    SYNTHROID 75 MCG Tab, TAKE ONE TABLET BY MOUTH EVERY MORNING ON AN EMPTY STOMACH, Disp: 7 Tablet, Rfl: 0    estradiol (ESTRACE) 0.1 MG/GM vaginal cream, Insert 1 g into the vagina at bedtime. Take everynight for 2 weeks, every other night for 2 weeks, then once or twice a week as needed., Disp: 42.5 g, Rfl: 1    acyclovir (ZOVIRAX) 5 % Ointment, Apply 1 Application. topically every 3 hours., Disp: 1 Each, Rfl: 3    vitamin D (CHOLECALCIFEROL) 1000 UNIT Tab, Take 1,000 Units by mouth every day., Disp: , Rfl:     Calcium Carb-Cholecalciferol 600-800 MG-UNIT Tab, Take 2 tablet by mouth every day., Disp: 60 Tab, Rfl: 11    PROBLEMS:  Patient Active Problem List    Diagnosis Date  "Noted    Persistent cough 11/06/2023    Other osteoporosis without current pathological fracture 11/23/2019    Dyslipidemia 11/30/2018    Closed wedge compression fracture of T10 vertebra (HCC) 08/29/2017    Migraine with aura and without status migrainosus, not intractable 03/10/2017    Acquired hypothyroidism 10/13/2016    Vaginal atrophy 10/13/2016       REVIEW OF SYSTEMS:  Gen.:  No Nausea, Vomiting, fever, Chills.  Heart: No chest pain.  Lungs:  No shortness of Breath.  Psychological: Collin unusual Anxiety depression     PHYSICAL EXAM      Constitutional: Alert, cooperative, not in acute distress.  Cardiovascular:  Rate Rhythm is regular without murmurs rubs clicks.     Thorax & Lungs: Clear to auscultation, no wheezing, rhonchi, or rales  HENT: Normocephalic, Atraumatic.  Eyes: PERRLA, EOMI, Conjunctiva normal.   Neck: Trachia is midline no swelling of the thyroid.   Lymphatic: No lymphadenopathy noted.   Neurologic: Alert & oriented x 3, cranial nerves II through XII are intact, Normal motor function, Normal sensory function, No focal deficits noted.   Psychiatric: Affect normal, Judgment normal, Mood normal.     VITAL SIGNS:/80   Pulse 74   Temp 36.3 °C (97.4 °F) (Temporal)   Resp 16   Ht 1.626 m (5' 4\")   Wt 63.5 kg (140 lb)   LMP 09/10/2007   SpO2 98%   BMI 24.03 kg/m²     Labs: Reviewed    Assessment:                                                     Plan:     1. Hypothyroidism, unspecified type  Continue levothyroxine  - SYNTHROID 75 MCG Tab; TAKE ONE TABLET BY MOUTH EVERY MORNING ON AN EMPTY STOMACH  Dispense: 90 Tablet; Refill: 3        "

## 2024-11-06 NOTE — TELEPHONE ENCOUNTER
Pharmacy claim for Synthroid 75MCG tablets, prescriber Ray Irizarry, has been rejected and requires prior authorization for coverage.  If clinically appropriate, you may change the prescription. A therapeutic alternative may be available. Questions on alternatives? Contact [employer] [facility] at [phone]. You can also review the plan's formulary online or call them directly. Compare the original medication with possible alternatives:  Diagnosis  MEDICATION PRIOR AUTH REQUIREMENT *  Euthyrox  Not Required  Levothyroxine  Not Required  Levoxyl  Not Required  Tirosint  Not Required  Unithroid  Not Required

## 2024-11-08 DIAGNOSIS — E03.9 HYPOTHYROIDISM, UNSPECIFIED TYPE: ICD-10-CM

## 2024-11-08 RX ORDER — LEVOTHYROXINE SODIUM 75 MCG
TABLET ORAL
Qty: 90 TABLET | Refills: 3 | Status: SHIPPED | OUTPATIENT
Start: 2024-11-08

## 2024-12-11 ENCOUNTER — OFFICE VISIT (OUTPATIENT)
Dept: MEDICAL GROUP | Facility: LAB | Age: 64
End: 2024-12-11
Payer: COMMERCIAL

## 2024-12-11 VITALS
TEMPERATURE: 97.1 F | DIASTOLIC BLOOD PRESSURE: 80 MMHG | RESPIRATION RATE: 18 BRPM | HEART RATE: 78 BPM | BODY MASS INDEX: 23.99 KG/M2 | HEIGHT: 65 IN | OXYGEN SATURATION: 100 % | WEIGHT: 143.96 LBS | SYSTOLIC BLOOD PRESSURE: 116 MMHG

## 2024-12-11 DIAGNOSIS — Z23 NEED FOR SHINGLES VACCINE: ICD-10-CM

## 2024-12-11 DIAGNOSIS — J02.9 SORE THROAT: ICD-10-CM

## 2024-12-11 LAB
FLUAV RNA SPEC QL NAA+PROBE: NEGATIVE
FLUBV RNA SPEC QL NAA+PROBE: NEGATIVE
INT CON NEG: NEGATIVE
INT CON POS: POSITIVE
RSV RNA SPEC QL NAA+PROBE: NEGATIVE
S PYO AG THROAT QL: NEGATIVE
SARS-COV-2 RNA RESP QL NAA+PROBE: NEGATIVE

## 2024-12-11 PROCEDURE — 0241U POCT CEPHEID COV-2, FLU A/B, RSV - PCR: CPT | Performed by: INTERNAL MEDICINE

## 2024-12-11 PROCEDURE — 3079F DIAST BP 80-89 MM HG: CPT | Performed by: INTERNAL MEDICINE

## 2024-12-11 PROCEDURE — 87880 STREP A ASSAY W/OPTIC: CPT | Performed by: INTERNAL MEDICINE

## 2024-12-11 PROCEDURE — 99213 OFFICE O/P EST LOW 20 MIN: CPT | Performed by: INTERNAL MEDICINE

## 2024-12-11 PROCEDURE — 3074F SYST BP LT 130 MM HG: CPT | Performed by: INTERNAL MEDICINE

## 2024-12-12 NOTE — PROGRESS NOTES
CC: Fabiana Flores is a 64 y.o. female is suffering from   Chief Complaint   Patient presents with    Pharyngitis     Sore throat for 5 days          SUBJECTIVE:  1. Sore throat  Patient is here for follow-up has a history of sore throat for the past 5 days, we discussed the need for strep testing and she will be caring for her granddaughter will be traveling back to [place]    2. Need for shingles vaccine  Patient is in need of shingles vaccine prescription written        Past social, family, history: , Bobby  Social History     Tobacco Use    Smoking status: Never    Smokeless tobacco: Never   Vaping Use    Vaping status: Never Used   Substance Use Topics    Alcohol use: No    Drug use: No         MEDICATIONS:    Current Outpatient Medications:     NON SPECIFIED, Please vaccinate with Shingrix vaccine, Disp: 1 Each, Rfl: 1    amoxicillin-clavulanate (AUGMENTIN) 875-125 MG Tab, Take 1 Tablet by mouth 2 times a day., Disp: 14 Tablet, Rfl: 0    SYNTHROID 75 MCG Tab, TAKE ONE TABLET BY MOUTH EVERY MORNING ON AN EMPTY STOMACH, Disp: 90 Tablet, Rfl: 3    levothyroxine (SYNTHROID) 75 MCG Tab, Take 1 Tablet by mouth every morning on an empty stomach., Disp: 90 Tablet, Rfl: 3    omeprazole (PRILOSEC) 20 MG delayed-release capsule, Take 1 Capsule by mouth 2 times a day., Disp: 40 Capsule, Rfl: 0    sucralfate (CARAFATE) 1 GM Tab, Take 1 Tablet by mouth 4 Times a Day,Before Meals and at Bedtime., Disp: 120 Tablet, Rfl: 3    SYNTHROID 75 MCG Tab, TAKE ONE TABLET BY MOUTH EVERY MORNING ON AN EMPTY STOMACH, Disp: 7 Tablet, Rfl: 0    estradiol (ESTRACE) 0.1 MG/GM vaginal cream, Insert 1 g into the vagina at bedtime. Take everynight for 2 weeks, every other night for 2 weeks, then once or twice a week as needed., Disp: 42.5 g, Rfl: 1    acyclovir (ZOVIRAX) 5 % Ointment, Apply 1 Application. topically every 3 hours., Disp: 1 Each, Rfl: 3    vitamin D (CHOLECALCIFEROL) 1000 UNIT Tab, Take 1,000 Units by mouth every day., Disp: ,  "Rfl:     Calcium Carb-Cholecalciferol 600-800 MG-UNIT Tab, Take 2 tablet by mouth every day., Disp: 60 Tab, Rfl: 11    PROBLEMS:  Patient Active Problem List    Diagnosis Date Noted    Persistent cough 11/06/2023    Other osteoporosis without current pathological fracture 11/23/2019    Dyslipidemia 11/30/2018    Closed wedge compression fracture of T10 vertebra (HCC) 08/29/2017    Migraine with aura and without status migrainosus, not intractable 03/10/2017    Acquired hypothyroidism 10/13/2016    Vaginal atrophy 10/13/2016       REVIEW OF SYSTEMS:  Gen.:  No Nausea, Vomiting, fever, Chills.  Heart: No chest pain.  Lungs:  No shortness of Breath.  Psychological: Collin unusual Anxiety depression     PHYSICAL EXAM      Constitutional: Alert, cooperative, not in acute distress.  Cardiovascular:  Rate Rhythm is regular without murmurs rubs clicks.     Thorax & Lungs: Clear to auscultation, no wheezing, rhonchi, or rales  HENT: Normocephalic, Atraumatic.  Eyes: PERRLA, EOMI, Conjunctiva normal.   Neck: Trachia is midline no swelling of the thyroid.   Lymphatic: No lymphadenopathy noted.   Musculoskeletal: No swelling appreciated anterior posterior lymph node chain, patient with questionable left submandibular swelling  Neurologic: Alert & oriented x 3, cranial nerves II through XII are intact, Normal motor function, Normal sensory function, No focal deficits noted.   Psychiatric: Affect normal, Judgment normal, Mood normal.     VITAL SIGNS:/80 (BP Location: Right arm, Patient Position: Sitting)   Pulse 78   Temp 36.2 °C (97.1 °F) (Temporal)   Resp 18   Ht 1.651 m (5' 5\")   Wt 65.3 kg (143 lb 15.4 oz)   LMP 09/10/2007   SpO2 100%   BMI 23.96 kg/m²     Labs: Reviewed    Assessment:                                                     Plan:     1. Sore throat  Rapid strep is negative COVID testing negative patient to have Augmentin due to travel  - POCT CoV-2, Flu A/B, RSV by PCR  - POCT Rapid Strep A  - " amoxicillin-clavulanate (AUGMENTIN) 875-125 MG Tab; Take 1 Tablet by mouth 2 times a day.  Dispense: 14 Tablet; Refill: 0    2. Need for shingles vaccine  Patient undergo Shingrix vaccine  - NON SPECIFIED; Please vaccinate with Shingrix vaccine  Dispense: 1 Each; Refill: 1

## 2025-02-20 ENCOUNTER — HOSPITAL ENCOUNTER (OUTPATIENT)
Dept: RADIOLOGY | Facility: MEDICAL CENTER | Age: 65
End: 2025-02-20
Attending: UROLOGY
Payer: COMMERCIAL

## 2025-02-20 DIAGNOSIS — N20.1 CALCULUS OF URETER: ICD-10-CM

## 2025-02-20 PROCEDURE — 74176 CT ABD & PELVIS W/O CONTRAST: CPT

## 2025-02-21 ENCOUNTER — OFFICE VISIT (OUTPATIENT)
Dept: MEDICAL GROUP | Facility: MEDICAL CENTER | Age: 65
End: 2025-02-21
Payer: COMMERCIAL

## 2025-02-21 ENCOUNTER — HOSPITAL ENCOUNTER (OUTPATIENT)
Facility: MEDICAL CENTER | Age: 65
End: 2025-02-21
Attending: FAMILY MEDICINE
Payer: COMMERCIAL

## 2025-02-21 VITALS
TEMPERATURE: 98.2 F | HEIGHT: 65 IN | BODY MASS INDEX: 23.16 KG/M2 | RESPIRATION RATE: 19 BRPM | WEIGHT: 139 LBS | OXYGEN SATURATION: 99 % | SYSTOLIC BLOOD PRESSURE: 138 MMHG | HEART RATE: 85 BPM | DIASTOLIC BLOOD PRESSURE: 82 MMHG

## 2025-02-21 DIAGNOSIS — R52 BODY ACHES: ICD-10-CM

## 2025-02-21 DIAGNOSIS — R05.1 ACUTE COUGH: ICD-10-CM

## 2025-02-21 DIAGNOSIS — R73.02 IMPAIRED GLUCOSE TOLERANCE: ICD-10-CM

## 2025-02-21 DIAGNOSIS — E03.9 ACQUIRED HYPOTHYROIDISM: Chronic | ICD-10-CM

## 2025-02-21 DIAGNOSIS — R68.83 CHILLS: ICD-10-CM

## 2025-02-21 DIAGNOSIS — J02.9 SORE THROAT: ICD-10-CM

## 2025-02-21 LAB
FLUAV RNA SPEC QL NAA+PROBE: NORMAL
FLUAV RNA SPEC QL NAA+PROBE: POSITIVE
FLUBV RNA SPEC QL NAA+PROBE: NEGATIVE
FLUBV RNA SPEC QL NAA+PROBE: NORMAL
RSV RNA SPEC QL NAA+PROBE: NEGATIVE
RSV RNA SPEC QL NAA+PROBE: NORMAL
S PYO DNA SPEC NAA+PROBE: NOT DETECTED
SARS-COV-2 RNA RESP QL NAA+PROBE: NORMAL
SARS-COV-2 RNA RESP QL NAA+PROBE: NOTDETECTED
SPECIMEN SOURCE: ABNORMAL

## 2025-02-21 PROCEDURE — 0241U HCHG SARS-COV-2 COVID-19 NFCT DS RESP RNA 4 TRGT MIC: CPT

## 2025-02-21 PROCEDURE — 87651 STREP A DNA AMP PROBE: CPT | Performed by: FAMILY MEDICINE

## 2025-02-21 PROCEDURE — 3079F DIAST BP 80-89 MM HG: CPT | Performed by: FAMILY MEDICINE

## 2025-02-21 PROCEDURE — 99213 OFFICE O/P EST LOW 20 MIN: CPT | Performed by: FAMILY MEDICINE

## 2025-02-21 PROCEDURE — 3075F SYST BP GE 130 - 139MM HG: CPT | Performed by: FAMILY MEDICINE

## 2025-02-21 PROCEDURE — 0241U POCT CEPHEID COV-2, FLU A/B, RSV - PCR: CPT | Performed by: FAMILY MEDICINE

## 2025-02-21 RX ORDER — OSELTAMIVIR PHOSPHATE 75 MG/1
75 CAPSULE ORAL 2 TIMES DAILY
Qty: 10 CAPSULE | Refills: 0 | Status: SHIPPED | OUTPATIENT
Start: 2025-02-21 | End: 2025-02-26

## 2025-02-21 RX ORDER — BENZONATATE 100 MG/1
100 CAPSULE ORAL 3 TIMES DAILY PRN
Qty: 60 CAPSULE | Refills: 0 | Status: SHIPPED | OUTPATIENT
Start: 2025-02-21

## 2025-02-21 ASSESSMENT — PATIENT HEALTH QUESTIONNAIRE - PHQ9: CLINICAL INTERPRETATION OF PHQ2 SCORE: 0

## 2025-02-22 NOTE — PROGRESS NOTES
Chief Complaint   Patient presents with    Body Aches     Chill/shiver, coughing, nasal congestion, sore throat, Tuesday,     Cough       Subjective:     HPI:   Fabiana Flores presents today with the following: She is a patient of Dr. Ferro.  She is generally well.    1. Sore throat/Chills/Body aches/Acute cough  Patient with sore throat, cough, body aches and chills since Tuesday.  The cough is particularly troublesome at night.  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  Rapid strep test here today is negative.  Patient is having very significant clear mucus.  Productive cough.  Body aches and chills.  Most likely to have contracted influenza as it is very active right now.  Patient did have her flu vaccine but had close exposure.  The rapid test failed but we have sent the sample off to the lab.  I am sending a Tamiflu prescription to the pharmacy to be filled if she is positive for flu.  She is not short of breath and has very good oxygen levels.  She is low risk for severe COVID complication and is up-to-date on her COVID vaccines.  Therefore after discussion decided not to prescribe Paxlovid.  Lab orders are placed.    2. Acquired hypothyroidism  Follow-up lab orders discussed and placed.    3. Impaired glucose tolerance  Lab orders discussed and placed.        Patient Active Problem List    Diagnosis Date Noted    Persistent cough 11/06/2023    Other osteoporosis without current pathological fracture 11/23/2019    Dyslipidemia 11/30/2018    Closed wedge compression fracture of T10 vertebra (HCC) 08/29/2017    Migraine with aura and without status migrainosus, not intractable 03/10/2017    Acquired hypothyroidism 10/13/2016    Vaginal atrophy 10/13/2016       Current medicines (including changes today)  Current Outpatient Medications   Medication Sig Dispense Refill    oseltamivir (TAMIFLU) 75 MG Cap Take 1 Capsule by mouth 2 times a day for 5 days. 10 Capsule 0    benzonatate (TESSALON) 100 MG Cap  "Take 1 Capsule by mouth 3 times a day as needed for Cough. 60 Capsule 0    NON SPECIFIED Please vaccinate with Shingrix vaccine 1 Each 1    omeprazole (PRILOSEC) 20 MG delayed-release capsule Take 1 Capsule by mouth 2 times a day. 40 Capsule 0    SYNTHROID 75 MCG Tab TAKE ONE TABLET BY MOUTH EVERY MORNING ON AN EMPTY STOMACH 7 Tablet 0    acyclovir (ZOVIRAX) 5 % Ointment Apply 1 Application. topically every 3 hours. 1 Each 3    vitamin D (CHOLECALCIFEROL) 1000 UNIT Tab Take 1,000 Units by mouth every day.      Calcium Carb-Cholecalciferol 600-800 MG-UNIT Tab Take 2 tablet by mouth every day. 60 Tab 11    SYNTHROID 75 MCG Tab TAKE ONE TABLET BY MOUTH EVERY MORNING ON AN EMPTY STOMACH 90 Tablet 3    sucralfate (CARAFATE) 1 GM Tab Take 1 Tablet by mouth 4 Times a Day,Before Meals and at Bedtime. (Patient not taking: Reported on 2/21/2025) 120 Tablet 3     No current facility-administered medications for this visit.       Allergies   Allergen Reactions    Ultram [Tramadol Hcl] Vomiting    Food      Celiac    Nkda [No Known Drug Allergy]      Gluten allergy       ROS: As per HPI       Objective:     /82   Pulse 85   Temp 36.8 °C (98.2 °F) (Temporal)   Resp 19   Ht 1.651 m (5' 5\")   Wt 63 kg (139 lb)   SpO2 99% on room air at rest.  Body mass index is 23.13 kg/m².    Physical Exam:  Constitutional: Well-developed and well-nourished. Not diaphoretic. No distress. Lucid and fluent.  She has a wet cough.  Skin: Skin is warm and dry. No rash noted.  Head: Atraumatic without lesions.  Eyes: Conjunctivae and extraocular motions are normal. Pupils are equal, round, and reactive to light. No scleral icterus.   Ears:  External ears unremarkable.   Neck: Good range of motion. No thyromegaly present. No cervical or supraclavicular lymphadenopathy. No JVD or carotid bruits appreciated  Cardiovascular: Regular rate and rhythm.  Normal S1, S2 without murmur appreciated.  Chest: Effort normal, no retractions. Clear to " auscultation throughout. No adventitious sounds.  There are upper airway sounds.  Extremities: No cyanosis, clubbing, erythema, nor edema.   Neurological: Alert and oriented x 3.   Psychiatric:  Behavior, mood, and affect are appropriate.    Rapid strep test is negative.  Nasal swab is sent to the lab.     Assessment and Plan:     65 y.o. female with the following issues:    1. Sore throat  POCT Cepheid Group A Strep - PCR    POCT Cepheid CoV-2, Flu A/B, RSV - PCR    CBC WITHOUT DIFFERENTIAL    CoV-2, Flu A/B, And RSV by PCR (Cepheid)    oseltamivir (TAMIFLU) 75 MG Cap      2. Chills  oseltamivir (TAMIFLU) 75 MG Cap      3. Body aches  Comp Metabolic Panel    oseltamivir (TAMIFLU) 75 MG Cap      4. Acute cough  Comp Metabolic Panel    CBC WITHOUT DIFFERENTIAL    oseltamivir (TAMIFLU) 75 MG Cap    benzonatate (TESSALON) 100 MG Cap      5. Acquired hypothyroidism  Comp Metabolic Panel    TSH      6. Impaired glucose tolerance  Comp Metabolic Panel    HEMOGLOBIN A1C            Followup: Return in about 4 months (around 6/21/2025), or if symptoms worsen or fail to improve, for Have asked her to follow-up with her PCP.

## 2025-03-03 ENCOUNTER — RESULTS FOLLOW-UP (OUTPATIENT)
Dept: MEDICAL GROUP | Facility: MEDICAL CENTER | Age: 65
End: 2025-03-03